# Patient Record
Sex: FEMALE | Race: ASIAN | NOT HISPANIC OR LATINO
[De-identification: names, ages, dates, MRNs, and addresses within clinical notes are randomized per-mention and may not be internally consistent; named-entity substitution may affect disease eponyms.]

---

## 2019-09-23 ENCOUNTER — APPOINTMENT (OUTPATIENT)
Dept: ORTHOPEDIC SURGERY | Facility: CLINIC | Age: 69
End: 2019-09-23

## 2019-10-04 PROBLEM — Z00.00 ENCOUNTER FOR PREVENTIVE HEALTH EXAMINATION: Status: ACTIVE | Noted: 2019-10-04

## 2019-10-07 ENCOUNTER — APPOINTMENT (OUTPATIENT)
Dept: ORTHOPEDIC SURGERY | Facility: CLINIC | Age: 69
End: 2019-10-07
Payer: MEDICAID

## 2019-10-07 VITALS — DIASTOLIC BLOOD PRESSURE: 62 MMHG | SYSTOLIC BLOOD PRESSURE: 148 MMHG | HEART RATE: 60 BPM

## 2019-10-07 PROCEDURE — 99203 OFFICE O/P NEW LOW 30 MIN: CPT

## 2020-01-21 ENCOUNTER — OUTPATIENT (OUTPATIENT)
Dept: OUTPATIENT SERVICES | Facility: HOSPITAL | Age: 70
LOS: 1 days | End: 2020-01-21
Payer: MEDICAID

## 2020-01-21 VITALS
TEMPERATURE: 97 F | HEIGHT: 61 IN | WEIGHT: 160.94 LBS | RESPIRATION RATE: 16 BRPM | OXYGEN SATURATION: 99 % | DIASTOLIC BLOOD PRESSURE: 78 MMHG | HEART RATE: 58 BPM | SYSTOLIC BLOOD PRESSURE: 140 MMHG

## 2020-01-21 DIAGNOSIS — M17.11 UNILATERAL PRIMARY OSTEOARTHRITIS, RIGHT KNEE: ICD-10-CM

## 2020-01-21 DIAGNOSIS — M19.90 UNSPECIFIED OSTEOARTHRITIS, UNSPECIFIED SITE: ICD-10-CM

## 2020-01-21 LAB
ANION GAP SERPL CALC-SCNC: 16 MMO/L — HIGH (ref 7–14)
APPEARANCE UR: CLEAR — SIGNIFICANT CHANGE UP
BILIRUB UR-MCNC: NEGATIVE — SIGNIFICANT CHANGE UP
BLD GP AB SCN SERPL QL: NEGATIVE — SIGNIFICANT CHANGE UP
BLOOD UR QL VISUAL: NEGATIVE — SIGNIFICANT CHANGE UP
BUN SERPL-MCNC: 12 MG/DL — SIGNIFICANT CHANGE UP (ref 7–23)
CALCIUM SERPL-MCNC: 9.4 MG/DL — SIGNIFICANT CHANGE UP (ref 8.4–10.5)
CHLORIDE SERPL-SCNC: 97 MMOL/L — LOW (ref 98–107)
CO2 SERPL-SCNC: 21 MMOL/L — LOW (ref 22–31)
COLOR SPEC: SIGNIFICANT CHANGE UP
CREAT SERPL-MCNC: 0.91 MG/DL — SIGNIFICANT CHANGE UP (ref 0.5–1.3)
GLUCOSE SERPL-MCNC: 82 MG/DL — SIGNIFICANT CHANGE UP (ref 70–99)
GLUCOSE UR-MCNC: NEGATIVE — SIGNIFICANT CHANGE UP
HCT VFR BLD CALC: 35.4 % — SIGNIFICANT CHANGE UP (ref 34.5–45)
HGB BLD-MCNC: 11.1 G/DL — LOW (ref 11.5–15.5)
KETONES UR-MCNC: NEGATIVE — SIGNIFICANT CHANGE UP
LEUKOCYTE ESTERASE UR-ACNC: NEGATIVE — SIGNIFICANT CHANGE UP
MCHC RBC-ENTMCNC: 25.3 PG — LOW (ref 27–34)
MCHC RBC-ENTMCNC: 31.4 % — LOW (ref 32–36)
MCV RBC AUTO: 80.8 FL — SIGNIFICANT CHANGE UP (ref 80–100)
NITRITE UR-MCNC: NEGATIVE — SIGNIFICANT CHANGE UP
NRBC # FLD: 0 K/UL — SIGNIFICANT CHANGE UP (ref 0–0)
PH UR: 6 — SIGNIFICANT CHANGE UP (ref 5–8)
PLATELET # BLD AUTO: 169 K/UL — SIGNIFICANT CHANGE UP (ref 150–400)
PMV BLD: 12.6 FL — SIGNIFICANT CHANGE UP (ref 7–13)
POTASSIUM SERPL-MCNC: 4.1 MMOL/L — SIGNIFICANT CHANGE UP (ref 3.5–5.3)
POTASSIUM SERPL-SCNC: 4.1 MMOL/L — SIGNIFICANT CHANGE UP (ref 3.5–5.3)
PROT UR-MCNC: NEGATIVE — SIGNIFICANT CHANGE UP
RBC # BLD: 4.38 M/UL — SIGNIFICANT CHANGE UP (ref 3.8–5.2)
RBC # FLD: 14 % — SIGNIFICANT CHANGE UP (ref 10.3–14.5)
RH IG SCN BLD-IMP: POSITIVE — SIGNIFICANT CHANGE UP
SODIUM SERPL-SCNC: 134 MMOL/L — LOW (ref 135–145)
SP GR SPEC: 1.01 — SIGNIFICANT CHANGE UP (ref 1–1.04)
UROBILINOGEN FLD QL: NORMAL — SIGNIFICANT CHANGE UP
WBC # BLD: 7.71 K/UL — SIGNIFICANT CHANGE UP (ref 3.8–10.5)
WBC # FLD AUTO: 7.71 K/UL — SIGNIFICANT CHANGE UP (ref 3.8–10.5)

## 2020-01-21 PROCEDURE — 93010 ELECTROCARDIOGRAM REPORT: CPT

## 2020-01-21 RX ORDER — SODIUM CHLORIDE 9 MG/ML
1000 INJECTION, SOLUTION INTRAVENOUS
Refills: 0 | Status: DISCONTINUED | OUTPATIENT
Start: 2020-02-06 | End: 2020-02-06

## 2020-01-21 NOTE — H&P PST ADULT - MUSCULOSKELETAL COMMENTS
hx of bilateral knee pain x5 years, worsening.  Dx with osteoarthritis scheduled for right total knee replacement 2/6/20. bilateral knee pain worsens with movement

## 2020-01-21 NOTE — H&P PST ADULT - HISTORY OF PRESENT ILLNESS
68 y/o female with hx of bilateral knee pain x5 years, worsening.  Dx with osteoarthritis scheduled for right total knee replacement 2/6/20.

## 2020-01-21 NOTE — H&P PST ADULT - INTERPRETATION SERVICES DECLINED
Patient/Caregiver requests family/friend to interpret./son.  Pt would like interpretation service day of surgery

## 2020-01-21 NOTE — H&P PST ADULT - NSICDXPROBLEM_GEN_ALL_CORE_FT
PROBLEM DIAGNOSES  Problem: Osteoarthritis  Assessment and Plan: Right total knee replacement 2/6/20.   CBc BMP T&S UA CS nasal culture  Preop instructions and antibacterial soap given and explained (verbal and written), with teach back.   Pt to see PMD for pre-op eval as advised by surgeon.  MAXIM precautions, OR booking informed

## 2020-01-21 NOTE — H&P PST ADULT - NSANTHOSAYNRD_GEN_A_CORE
No. MAXIM screening performed.  STOP BANG Legend: 0-2 = LOW Risk; 3-4 = INTERMEDIATE Risk; 5-8 = HIGH Risk

## 2020-01-22 LAB — SPECIMEN SOURCE: SIGNIFICANT CHANGE UP

## 2020-01-23 LAB
BACTERIA UR CULT: SIGNIFICANT CHANGE UP
SPECIMEN SOURCE: SIGNIFICANT CHANGE UP

## 2020-01-24 LAB — BACTERIA NPH CULT: SIGNIFICANT CHANGE UP

## 2020-02-01 ENCOUNTER — OUTPATIENT (OUTPATIENT)
Dept: OUTPATIENT SERVICES | Facility: HOSPITAL | Age: 70
LOS: 1 days | End: 2020-02-01
Payer: MEDICAID

## 2020-02-05 ENCOUNTER — TRANSCRIPTION ENCOUNTER (OUTPATIENT)
Age: 70
End: 2020-02-05

## 2020-02-05 NOTE — ASU PATIENT PROFILE, ADULT - LANGUAGE ASSISTANCE NEEDED
pt requested son to translate through /Yes-Patient/Caregiver accepts free interpretation services...

## 2020-02-06 ENCOUNTER — APPOINTMENT (OUTPATIENT)
Dept: ORTHOPEDIC SURGERY | Facility: HOSPITAL | Age: 70
End: 2020-02-06

## 2020-02-06 ENCOUNTER — RESULT REVIEW (OUTPATIENT)
Age: 70
End: 2020-02-06

## 2020-02-06 ENCOUNTER — INPATIENT (INPATIENT)
Facility: HOSPITAL | Age: 70
LOS: 3 days | Discharge: HOME | End: 2020-02-10
Attending: ORTHOPAEDIC SURGERY | Admitting: ORTHOPAEDIC SURGERY
Payer: MEDICAID

## 2020-02-06 VITALS
HEIGHT: 62 IN | HEART RATE: 59 BPM | TEMPERATURE: 98 F | DIASTOLIC BLOOD PRESSURE: 63 MMHG | RESPIRATION RATE: 14 BRPM | WEIGHT: 164.02 LBS | SYSTOLIC BLOOD PRESSURE: 170 MMHG | OXYGEN SATURATION: 100 %

## 2020-02-06 DIAGNOSIS — M17.11 UNILATERAL PRIMARY OSTEOARTHRITIS, RIGHT KNEE: ICD-10-CM

## 2020-02-06 LAB
ANION GAP SERPL CALC-SCNC: 11 MMO/L — SIGNIFICANT CHANGE UP (ref 7–14)
ANION GAP SERPL CALC-SCNC: 13 MMO/L — SIGNIFICANT CHANGE UP (ref 7–14)
BUN SERPL-MCNC: 12 MG/DL — SIGNIFICANT CHANGE UP (ref 7–23)
BUN SERPL-MCNC: 13 MG/DL — SIGNIFICANT CHANGE UP (ref 7–23)
CALCIUM SERPL-MCNC: 8.6 MG/DL — SIGNIFICANT CHANGE UP (ref 8.4–10.5)
CALCIUM SERPL-MCNC: 8.9 MG/DL — SIGNIFICANT CHANGE UP (ref 8.4–10.5)
CHLORIDE SERPL-SCNC: 87 MMOL/L — LOW (ref 98–107)
CHLORIDE SERPL-SCNC: 90 MMOL/L — LOW (ref 98–107)
CO2 SERPL-SCNC: 23 MMOL/L — SIGNIFICANT CHANGE UP (ref 22–31)
CO2 SERPL-SCNC: 23 MMOL/L — SIGNIFICANT CHANGE UP (ref 22–31)
CREAT SERPL-MCNC: 0.99 MG/DL — SIGNIFICANT CHANGE UP (ref 0.5–1.3)
CREAT SERPL-MCNC: 1.01 MG/DL — SIGNIFICANT CHANGE UP (ref 0.5–1.3)
GLUCOSE SERPL-MCNC: 106 MG/DL — HIGH (ref 70–99)
GLUCOSE SERPL-MCNC: 99 MG/DL — SIGNIFICANT CHANGE UP (ref 70–99)
HCT VFR BLD CALC: 28.9 % — LOW (ref 34.5–45)
HGB BLD-MCNC: 9.6 G/DL — LOW (ref 11.5–15.5)
MCHC RBC-ENTMCNC: 26 PG — LOW (ref 27–34)
MCHC RBC-ENTMCNC: 33.2 % — SIGNIFICANT CHANGE UP (ref 32–36)
MCV RBC AUTO: 78.3 FL — LOW (ref 80–100)
NRBC # FLD: 0 K/UL — SIGNIFICANT CHANGE UP (ref 0–0)
PLATELET # BLD AUTO: 153 K/UL — SIGNIFICANT CHANGE UP (ref 150–400)
PMV BLD: 12.8 FL — SIGNIFICANT CHANGE UP (ref 7–13)
POTASSIUM SERPL-MCNC: 3.9 MMOL/L — SIGNIFICANT CHANGE UP (ref 3.5–5.3)
POTASSIUM SERPL-MCNC: 3.9 MMOL/L — SIGNIFICANT CHANGE UP (ref 3.5–5.3)
POTASSIUM SERPL-SCNC: 3.9 MMOL/L — SIGNIFICANT CHANGE UP (ref 3.5–5.3)
POTASSIUM SERPL-SCNC: 3.9 MMOL/L — SIGNIFICANT CHANGE UP (ref 3.5–5.3)
RBC # BLD: 3.69 M/UL — LOW (ref 3.8–5.2)
RBC # FLD: 13.6 % — SIGNIFICANT CHANGE UP (ref 10.3–14.5)
RH IG SCN BLD-IMP: POSITIVE — SIGNIFICANT CHANGE UP
SODIUM SERPL-SCNC: 123 MMOL/L — LOW (ref 135–145)
SODIUM SERPL-SCNC: 124 MMOL/L — LOW (ref 135–145)
WBC # BLD: 7.19 K/UL — SIGNIFICANT CHANGE UP (ref 3.8–10.5)
WBC # FLD AUTO: 7.19 K/UL — SIGNIFICANT CHANGE UP (ref 3.8–10.5)

## 2020-02-06 PROCEDURE — 88311 DECALCIFY TISSUE: CPT | Mod: 26

## 2020-02-06 PROCEDURE — 27447 TOTAL KNEE ARTHROPLASTY: CPT | Mod: RT

## 2020-02-06 PROCEDURE — 73560 X-RAY EXAM OF KNEE 1 OR 2: CPT | Mod: 26,RT

## 2020-02-06 PROCEDURE — 88305 TISSUE EXAM BY PATHOLOGIST: CPT | Mod: 26

## 2020-02-06 RX ORDER — KETOROLAC TROMETHAMINE 30 MG/ML
15 SYRINGE (ML) INJECTION EVERY 6 HOURS
Refills: 0 | Status: DISCONTINUED | OUTPATIENT
Start: 2020-02-06 | End: 2020-02-08

## 2020-02-06 RX ORDER — LISINOPRIL 2.5 MG/1
20 TABLET ORAL DAILY
Refills: 0 | Status: DISCONTINUED | OUTPATIENT
Start: 2020-02-06 | End: 2020-02-07

## 2020-02-06 RX ORDER — ACETAMINOPHEN 500 MG
975 TABLET ORAL EVERY 8 HOURS
Refills: 0 | Status: DISCONTINUED | OUTPATIENT
Start: 2020-02-06 | End: 2020-02-10

## 2020-02-06 RX ORDER — OXYCODONE HYDROCHLORIDE 5 MG/1
5 TABLET ORAL EVERY 4 HOURS
Refills: 0 | Status: DISCONTINUED | OUTPATIENT
Start: 2020-02-06 | End: 2020-02-10

## 2020-02-06 RX ORDER — SODIUM CHLORIDE 9 MG/ML
500 INJECTION INTRAMUSCULAR; INTRAVENOUS; SUBCUTANEOUS ONCE
Refills: 0 | Status: DISCONTINUED | OUTPATIENT
Start: 2020-02-06 | End: 2020-02-07

## 2020-02-06 RX ORDER — GABAPENTIN 400 MG/1
300 CAPSULE ORAL ONCE
Refills: 0 | Status: COMPLETED | OUTPATIENT
Start: 2020-02-06 | End: 2020-02-06

## 2020-02-06 RX ORDER — SENNA PLUS 8.6 MG/1
2 TABLET ORAL AT BEDTIME
Refills: 0 | Status: DISCONTINUED | OUTPATIENT
Start: 2020-02-06 | End: 2020-02-07

## 2020-02-06 RX ORDER — TRAMADOL HYDROCHLORIDE 50 MG/1
50 TABLET ORAL ONCE
Refills: 0 | Status: DISCONTINUED | OUTPATIENT
Start: 2020-02-06 | End: 2020-02-06

## 2020-02-06 RX ORDER — POLYETHYLENE GLYCOL 3350 17 G/17G
17 POWDER, FOR SOLUTION ORAL DAILY
Refills: 0 | Status: DISCONTINUED | OUTPATIENT
Start: 2020-02-06 | End: 2020-02-10

## 2020-02-06 RX ORDER — TRAMADOL HYDROCHLORIDE 50 MG/1
50 TABLET ORAL EVERY 8 HOURS
Refills: 0 | Status: DISCONTINUED | OUTPATIENT
Start: 2020-02-06 | End: 2020-02-10

## 2020-02-06 RX ORDER — ACETAMINOPHEN 500 MG
975 TABLET ORAL ONCE
Refills: 0 | Status: COMPLETED | OUTPATIENT
Start: 2020-02-06 | End: 2020-02-06

## 2020-02-06 RX ORDER — HYDROCHLOROTHIAZIDE 25 MG
12.5 TABLET ORAL DAILY
Refills: 0 | Status: DISCONTINUED | OUTPATIENT
Start: 2020-02-06 | End: 2020-02-06

## 2020-02-06 RX ORDER — METOPROLOL TARTRATE 50 MG
50 TABLET ORAL DAILY
Refills: 0 | Status: DISCONTINUED | OUTPATIENT
Start: 2020-02-06 | End: 2020-02-07

## 2020-02-06 RX ORDER — PANTOPRAZOLE SODIUM 20 MG/1
40 TABLET, DELAYED RELEASE ORAL
Refills: 0 | Status: DISCONTINUED | OUTPATIENT
Start: 2020-02-06 | End: 2020-02-10

## 2020-02-06 RX ORDER — ONDANSETRON 8 MG/1
4 TABLET, FILM COATED ORAL EVERY 6 HOURS
Refills: 0 | Status: DISCONTINUED | OUTPATIENT
Start: 2020-02-06 | End: 2020-02-10

## 2020-02-06 RX ORDER — MAGNESIUM HYDROXIDE 400 MG/1
30 TABLET, CHEWABLE ORAL DAILY
Refills: 0 | Status: DISCONTINUED | OUTPATIENT
Start: 2020-02-06 | End: 2020-02-10

## 2020-02-06 RX ORDER — SODIUM CHLORIDE 9 MG/ML
500 INJECTION INTRAMUSCULAR; INTRAVENOUS; SUBCUTANEOUS ONCE
Refills: 0 | Status: COMPLETED | OUTPATIENT
Start: 2020-02-06 | End: 2020-02-06

## 2020-02-06 RX ORDER — HYDROMORPHONE HYDROCHLORIDE 2 MG/ML
0.5 INJECTION INTRAMUSCULAR; INTRAVENOUS; SUBCUTANEOUS EVERY 4 HOURS
Refills: 0 | Status: DISCONTINUED | OUTPATIENT
Start: 2020-02-06 | End: 2020-02-07

## 2020-02-06 RX ORDER — SODIUM CHLORIDE 9 MG/ML
1000 INJECTION INTRAMUSCULAR; INTRAVENOUS; SUBCUTANEOUS
Refills: 0 | Status: DISCONTINUED | OUTPATIENT
Start: 2020-02-06 | End: 2020-02-06

## 2020-02-06 RX ORDER — ASPIRIN/CALCIUM CARB/MAGNESIUM 324 MG
325 TABLET ORAL
Refills: 0 | Status: DISCONTINUED | OUTPATIENT
Start: 2020-02-06 | End: 2020-02-10

## 2020-02-06 RX ORDER — PANTOPRAZOLE SODIUM 20 MG/1
40 TABLET, DELAYED RELEASE ORAL ONCE
Refills: 0 | Status: COMPLETED | OUTPATIENT
Start: 2020-02-06 | End: 2020-02-06

## 2020-02-06 RX ORDER — CEFAZOLIN SODIUM 1 G
2000 VIAL (EA) INJECTION EVERY 8 HOURS
Refills: 0 | Status: COMPLETED | OUTPATIENT
Start: 2020-02-06 | End: 2020-02-07

## 2020-02-06 RX ORDER — ONDANSETRON 8 MG/1
4 TABLET, FILM COATED ORAL ONCE
Refills: 0 | Status: DISCONTINUED | OUTPATIENT
Start: 2020-02-06 | End: 2020-02-06

## 2020-02-06 RX ORDER — HYDROMORPHONE HYDROCHLORIDE 2 MG/ML
0.5 INJECTION INTRAMUSCULAR; INTRAVENOUS; SUBCUTANEOUS
Refills: 0 | Status: DISCONTINUED | OUTPATIENT
Start: 2020-02-06 | End: 2020-02-06

## 2020-02-06 RX ORDER — OXYCODONE HYDROCHLORIDE 5 MG/1
10 TABLET ORAL EVERY 4 HOURS
Refills: 0 | Status: DISCONTINUED | OUTPATIENT
Start: 2020-02-06 | End: 2020-02-10

## 2020-02-06 RX ADMIN — Medication 975 MILLIGRAM(S): at 12:09

## 2020-02-06 RX ADMIN — Medication 975 MILLIGRAM(S): at 20:07

## 2020-02-06 RX ADMIN — SODIUM CHLORIDE 30 MILLILITER(S): 9 INJECTION, SOLUTION INTRAVENOUS at 12:08

## 2020-02-06 RX ADMIN — Medication 325 MILLIGRAM(S): at 20:07

## 2020-02-06 RX ADMIN — GABAPENTIN 300 MILLIGRAM(S): 400 CAPSULE ORAL at 12:08

## 2020-02-06 RX ADMIN — TRAMADOL HYDROCHLORIDE 50 MILLIGRAM(S): 50 TABLET ORAL at 12:10

## 2020-02-06 RX ADMIN — Medication 15 MILLIGRAM(S): at 17:30

## 2020-02-06 RX ADMIN — Medication 15 MILLIGRAM(S): at 17:15

## 2020-02-06 RX ADMIN — TRAMADOL HYDROCHLORIDE 50 MILLIGRAM(S): 50 TABLET ORAL at 21:29

## 2020-02-06 RX ADMIN — Medication 100 MILLIGRAM(S): at 21:29

## 2020-02-06 RX ADMIN — PANTOPRAZOLE SODIUM 40 MILLIGRAM(S): 20 TABLET, DELAYED RELEASE ORAL at 12:09

## 2020-02-06 RX ADMIN — SODIUM CHLORIDE 500 MILLILITER(S): 9 INJECTION INTRAMUSCULAR; INTRAVENOUS; SUBCUTANEOUS at 20:07

## 2020-02-06 NOTE — ASU PREOP CHECKLIST - 1.
speaks Daniel, requests son Tremayne Saleh to translate through  #[id number] Marisela speaks Daniel, requests son Tremayne Saleh [phone] to translate through  #[id number] Marisela

## 2020-02-06 NOTE — PROGRESS NOTE ADULT - SUBJECTIVE AND OBJECTIVE BOX
Patient is seen and examined. Denies CP/SOB/DIzziness/N/V/D/HA. Pain is controlled.     Vital Signs Last 24 Hrs  T(C): 36.4 (06 Feb 2020 16:30), Max: 36.6 (06 Feb 2020 11:16)  T(F): 97.5 (06 Feb 2020 16:30), Max: 97.9 (06 Feb 2020 11:16)  HR: 55 (06 Feb 2020 17:00) (52 - 59)  BP: 140/74 (06 Feb 2020 17:00) (110/53 - 170/63)  BP(mean): 91 (06 Feb 2020 17:00) (67 - 91)  RR: 20 (06 Feb 2020 17:00) (12 - 20)  SpO2: 100% (06 Feb 2020 17:00) (100% - 100%)    Gen: NAD    RLE: Dressing C/D/I  Motor intact RLE. Sensation is grossly intact in the RLE. Compartments are soft, extremities are warm. DP 1+ BL LE.    Labs: Pending     A/P: Patient is a 69y Female s/p R TKA    - Pain control / Analgesia  - Anticoagulation  -PT/OT  -WBAT  -OOB  -Inc Spirometry  -Foot Pumps  -F/U AM Labs  -Notify Ortho with any questions

## 2020-02-07 ENCOUNTER — TRANSCRIPTION ENCOUNTER (OUTPATIENT)
Age: 70
End: 2020-02-07

## 2020-02-07 DIAGNOSIS — D62 ACUTE POSTHEMORRHAGIC ANEMIA: ICD-10-CM

## 2020-02-07 DIAGNOSIS — E87.6 HYPOKALEMIA: ICD-10-CM

## 2020-02-07 DIAGNOSIS — M17.11 UNILATERAL PRIMARY OSTEOARTHRITIS, RIGHT KNEE: ICD-10-CM

## 2020-02-07 DIAGNOSIS — Z29.9 ENCOUNTER FOR PROPHYLACTIC MEASURES, UNSPECIFIED: ICD-10-CM

## 2020-02-07 DIAGNOSIS — I10 ESSENTIAL (PRIMARY) HYPERTENSION: ICD-10-CM

## 2020-02-07 DIAGNOSIS — E87.1 HYPO-OSMOLALITY AND HYPONATREMIA: ICD-10-CM

## 2020-02-07 DIAGNOSIS — K21.9 GASTRO-ESOPHAGEAL REFLUX DISEASE WITHOUT ESOPHAGITIS: ICD-10-CM

## 2020-02-07 LAB
ANION GAP SERPL CALC-SCNC: 10 MMO/L — SIGNIFICANT CHANGE UP (ref 7–14)
ANION GAP SERPL CALC-SCNC: 11 MMO/L — SIGNIFICANT CHANGE UP (ref 7–14)
BLD GP AB SCN SERPL QL: NEGATIVE — SIGNIFICANT CHANGE UP
BUN SERPL-MCNC: 10 MG/DL — SIGNIFICANT CHANGE UP (ref 7–23)
BUN SERPL-MCNC: 11 MG/DL — SIGNIFICANT CHANGE UP (ref 7–23)
CALCIUM SERPL-MCNC: 8.1 MG/DL — LOW (ref 8.4–10.5)
CALCIUM SERPL-MCNC: 8.1 MG/DL — LOW (ref 8.4–10.5)
CHLORIDE SERPL-SCNC: 84 MMOL/L — LOW (ref 98–107)
CHLORIDE SERPL-SCNC: 89 MMOL/L — LOW (ref 98–107)
CO2 SERPL-SCNC: 21 MMOL/L — LOW (ref 22–31)
CO2 SERPL-SCNC: 22 MMOL/L — SIGNIFICANT CHANGE UP (ref 22–31)
CREAT SERPL-MCNC: 0.89 MG/DL — SIGNIFICANT CHANGE UP (ref 0.5–1.3)
CREAT SERPL-MCNC: 0.97 MG/DL — SIGNIFICANT CHANGE UP (ref 0.5–1.3)
GLUCOSE SERPL-MCNC: 105 MG/DL — HIGH (ref 70–99)
GLUCOSE SERPL-MCNC: 124 MG/DL — HIGH (ref 70–99)
HCT VFR BLD CALC: 27.6 % — LOW (ref 34.5–45)
HGB BLD-MCNC: 9 G/DL — LOW (ref 11.5–15.5)
MCHC RBC-ENTMCNC: 25.9 PG — LOW (ref 27–34)
MCHC RBC-ENTMCNC: 32.6 % — SIGNIFICANT CHANGE UP (ref 32–36)
MCV RBC AUTO: 79.5 FL — LOW (ref 80–100)
NRBC # FLD: 0 K/UL — SIGNIFICANT CHANGE UP (ref 0–0)
OSMOLALITY SERPL: 250 MOSMO/KG — LOW (ref 275–295)
PLATELET # BLD AUTO: 130 K/UL — LOW (ref 150–400)
PMV BLD: 13.1 FL — HIGH (ref 7–13)
POTASSIUM SERPL-MCNC: 3.3 MMOL/L — LOW (ref 3.5–5.3)
POTASSIUM SERPL-MCNC: 3.9 MMOL/L — SIGNIFICANT CHANGE UP (ref 3.5–5.3)
POTASSIUM SERPL-SCNC: 3.3 MMOL/L — LOW (ref 3.5–5.3)
POTASSIUM SERPL-SCNC: 3.9 MMOL/L — SIGNIFICANT CHANGE UP (ref 3.5–5.3)
RBC # BLD: 3.47 M/UL — LOW (ref 3.8–5.2)
RBC # FLD: 13.9 % — SIGNIFICANT CHANGE UP (ref 10.3–14.5)
RH IG SCN BLD-IMP: POSITIVE — SIGNIFICANT CHANGE UP
SODIUM SERPL-SCNC: 117 MMOL/L — CRITICAL LOW (ref 135–145)
SODIUM SERPL-SCNC: 120 MMOL/L — CRITICAL LOW (ref 135–145)
WBC # BLD: 8.04 K/UL — SIGNIFICANT CHANGE UP (ref 3.8–10.5)
WBC # FLD AUTO: 8.04 K/UL — SIGNIFICANT CHANGE UP (ref 3.8–10.5)

## 2020-02-07 PROCEDURE — 99223 1ST HOSP IP/OBS HIGH 75: CPT

## 2020-02-07 RX ORDER — POTASSIUM CHLORIDE 20 MEQ
20 PACKET (EA) ORAL ONCE
Refills: 0 | Status: COMPLETED | OUTPATIENT
Start: 2020-02-07 | End: 2020-02-07

## 2020-02-07 RX ORDER — HYDROMORPHONE HYDROCHLORIDE 2 MG/ML
0.5 INJECTION INTRAMUSCULAR; INTRAVENOUS; SUBCUTANEOUS ONCE
Refills: 0 | Status: DISCONTINUED | OUTPATIENT
Start: 2020-02-07 | End: 2020-02-07

## 2020-02-07 RX ORDER — METOPROLOL TARTRATE 50 MG
12.5 TABLET ORAL
Refills: 0 | Status: DISCONTINUED | OUTPATIENT
Start: 2020-02-07 | End: 2020-02-10

## 2020-02-07 RX ORDER — SENNA PLUS 8.6 MG/1
2 TABLET ORAL AT BEDTIME
Refills: 0 | Status: DISCONTINUED | OUTPATIENT
Start: 2020-02-07 | End: 2020-02-10

## 2020-02-07 RX ORDER — SODIUM CHLORIDE 9 MG/ML
2 INJECTION INTRAMUSCULAR; INTRAVENOUS; SUBCUTANEOUS ONCE
Refills: 0 | Status: COMPLETED | OUTPATIENT
Start: 2020-02-07 | End: 2020-02-07

## 2020-02-07 RX ADMIN — ONDANSETRON 4 MILLIGRAM(S): 8 TABLET, FILM COATED ORAL at 10:34

## 2020-02-07 RX ADMIN — Medication 20 MILLIEQUIVALENT(S): at 15:32

## 2020-02-07 RX ADMIN — TRAMADOL HYDROCHLORIDE 50 MILLIGRAM(S): 50 TABLET ORAL at 13:38

## 2020-02-07 RX ADMIN — Medication 975 MILLIGRAM(S): at 12:05

## 2020-02-07 RX ADMIN — Medication 325 MILLIGRAM(S): at 17:59

## 2020-02-07 RX ADMIN — Medication 15 MILLIGRAM(S): at 12:05

## 2020-02-07 RX ADMIN — SODIUM CHLORIDE 2 GRAM(S): 9 INJECTION INTRAMUSCULAR; INTRAVENOUS; SUBCUTANEOUS at 23:00

## 2020-02-07 RX ADMIN — Medication 50 MILLIGRAM(S): at 05:40

## 2020-02-07 RX ADMIN — PANTOPRAZOLE SODIUM 40 MILLIGRAM(S): 20 TABLET, DELAYED RELEASE ORAL at 05:40

## 2020-02-07 RX ADMIN — Medication 15 MILLIGRAM(S): at 05:39

## 2020-02-07 RX ADMIN — Medication 15 MILLIGRAM(S): at 18:00

## 2020-02-07 RX ADMIN — TRAMADOL HYDROCHLORIDE 50 MILLIGRAM(S): 50 TABLET ORAL at 21:45

## 2020-02-07 RX ADMIN — Medication 100 MILLIGRAM(S): at 05:39

## 2020-02-07 RX ADMIN — Medication 325 MILLIGRAM(S): at 05:40

## 2020-02-07 RX ADMIN — Medication 50 MILLIGRAM(S): at 18:00

## 2020-02-07 RX ADMIN — Medication 975 MILLIGRAM(S): at 04:22

## 2020-02-07 RX ADMIN — LISINOPRIL 20 MILLIGRAM(S): 2.5 TABLET ORAL at 05:40

## 2020-02-07 RX ADMIN — Medication 12.5 MILLIGRAM(S): at 17:59

## 2020-02-07 RX ADMIN — Medication 15 MILLIGRAM(S): at 00:04

## 2020-02-07 RX ADMIN — Medication 975 MILLIGRAM(S): at 20:07

## 2020-02-07 RX ADMIN — SENNA PLUS 2 TABLET(S): 8.6 TABLET ORAL at 21:45

## 2020-02-07 RX ADMIN — POLYETHYLENE GLYCOL 3350 17 GRAM(S): 17 POWDER, FOR SOLUTION ORAL at 12:05

## 2020-02-07 RX ADMIN — TRAMADOL HYDROCHLORIDE 50 MILLIGRAM(S): 50 TABLET ORAL at 05:42

## 2020-02-07 NOTE — DISCHARGE NOTE PROVIDER - NSDCCPCAREPLAN_GEN_ALL_CORE_FT
PRINCIPAL DISCHARGE DIAGNOSIS  Diagnosis: Primary osteoarthritis of right knee  Assessment and Plan of Treatment: 69year old female is admitted for elective Right total knee arthroplasty 2/6/2020 without any intraoperative complications.  Patient is doing well and stable for discharge.  Patient is tolerating physical therapy: weight bearing to Right leg, gait training, exercises as shown by Physical Therapy.  Keep wound dressing on as is until day of office visit.  Staples/sutures if applicable, to be removed in the office 14 days from date of surgery.  Patient is on Aspirin (MUST TAKE WITH FOOD) for anticoagulation.  Orthopaedic Surgeon Dr. Kaur would like patient to call private MD/Internist for appointment postop to maintain continuity of care.  Follow up with Dr. Kaur's office in 1-2 weeks. PRINCIPAL DISCHARGE DIAGNOSIS  Diagnosis: Primary osteoarthritis of right knee  Assessment and Plan of Treatment: 69year old female is admitted for elective Right total knee arthroplasty 2/6/2020 without any intraoperative complications.  Patient is doing well and stable for discharge.  Patient is tolerating physical therapy: weight bearing to Right leg, gait training, exercises as shown by Physical Therapy.  Keep wound dressing on as is until day of office visit.  Staples/sutures if applicable, to be removed in the office 14 days from date of surgery.  Patient is on Aspirin (MUST TAKE WITH FOOD) for anticoagulation.  Follow up with Dr. Kaur's office in 1-2 weeks.  Patient was managed medically by OHOS (Orthopaedic Hospitalist On Service) and found to have pre-renal hyponatremia, for which salt tabs and normal saline bolus resolved the low sodium level.  As per OHOS, patient not to resume home med hydrochlorothiazide until follow up with internist.  OHOS and Orthopaedic Surgeon Dr. Kaur would like patient to call private MD/Internist for appointment postop to maintain continuity of care.    Istop reviewed Reference #: [id number]

## 2020-02-07 NOTE — DISCHARGE NOTE PROVIDER - HOSPITAL COURSE
69year old female is admitted for elective Right total knee arthroplasty 2/6/2020 without any intraoperative complications.  Patient is doing well and stable for discharge.  Patient is tolerating physical therapy: weight bearing to Right leg, gait training, exercises as shown by Physical Therapy.  Keep wound dressing on as is until day of office visit.  Staples/sutures if applicable, to be removed in the office 14 days from date of surgery.  Patient is on Aspirin (MUST TAKE WITH FOOD) for anticoagulation.  Orthopaedic Surgeon Dr. Kaur would like patient to call private MD/Internist for appointment postop to maintain continuity of care.  Follow up with Dr. Kaur's office in 1-2 weeks. 69year old female is admitted for elective Right total knee arthroplasty 2/6/2020 without any intraoperative complications.  Patient is doing well and stable for discharge.  Patient is tolerating physical therapy: weight bearing to Right leg, gait training, exercises as shown by Physical Therapy.  Keep wound dressing on as is until day of office visit.  Staples/sutures if applicable, to be removed in the office 14 days from date of surgery.  Patient is on Aspirin (MUST TAKE WITH FOOD) for anticoagulation.  Follow up with Dr. Kaur's office in 1-2 weeks.  Patient was managed medically by OHOS (Orthopaedic Hospitalist On Service) and found to have pre-renal hyponatremia, for which salt tabs and normal saline bolus resolved the low sodium level.  As per OHOS, patient not to resume home med hydrochlorothiazide until follow up with internist.  OHOS and Orthopaedic Surgeon Dr. Kaur would like patient to call private MD/Internist for appointment postop to maintain continuity of care.          Istop reviewed Reference #: [id number]

## 2020-02-07 NOTE — DISCHARGE NOTE NURSING/CASE MANAGEMENT/SOCIAL WORK - NSDPDISTO_GEN_ALL_CORE
Home with home care/Surgical knee dressing clean, dry and intact.  Toes warm and mobile.  Surgical leg elevated as instructed. +neurovascular status,, +void.  Tolerated po and fluid.  OOB ambulating with walker and one stand by assist.  Foot pump in use.    Incentive spirometer with proper use and teach back.  Call bell in reach, safety maintained.

## 2020-02-07 NOTE — PROVIDER CONTACT NOTE (CRITICAL VALUE NOTIFICATION) - ASSESSMENT
Pt VSS in RA. Asymptomatic. Ready for transport to [address] [place] [address]
Pt. alert, oriented X4. Voids. OOB with walker and assist. Daniel speaking.

## 2020-02-07 NOTE — DISCHARGE NOTE NURSING/CASE MANAGEMENT/SOCIAL WORK - NSDCPECAREGIVERED_GEN_ALL_CORE
No/know your medication side effects, managing pain at home, exercise worksheet, sex positions after joint replacement,, knee replacement, discharge instruction sheet

## 2020-02-07 NOTE — OCCUPATIONAL THERAPY INITIAL EVALUATION ADULT - PRECAUTIONS/LIMITATIONS, REHAB EVAL
surgical precautions/fall precautions/Right Knee Immobilizer- only when ambulating, may discharge when patient able to actively straight leg raise. Pt is able to perform straight leg raise.

## 2020-02-07 NOTE — DISCHARGE NOTE PROVIDER - NSDCMRMEDTOKEN_GEN_ALL_CORE_FT
calcium: 1 tab oral daily  diclofenac potassium 50 mg oral tablet: 1 tab(s) orally 3 times a day, As Needed. last dose 1/21/20  Dry Eye Relief ophthalmic solution OTC: 1 drop each eye prn   lisinopril-hydrochlorothiazide 20 mg-12.5 mg oral tablet: 1 tab(s) orally once a day  metoprolol tartrate 50 mg oral tablet: orally once a day  omeprazole 20 mg oral delayed release tablet: 1 tab(s) orally once a day  raNITIdine 150 mg oral capsule: 1 cap(s) orally 2 times a day  Tab-A-Blanche oral tablet: 1 tab(s) orally once a day.. last dose  1/21/20 acetaminophen 325 mg oral capsule: 3 cap(s) orally every 8 hours   Adult Aspirin 325 mg oral tablet: 1 tab(s) orally 2 times a day   calcium: 1 tab oral daily  Colace 100 mg oral capsule: 1 cap(s) orally 3 times a day   Dry Eye Relief ophthalmic solution OTC: 1 drop each eye prn   gabapentin 100 mg oral capsule: 1 cap(s) orally every 8 hours   lisinopril-hydrochlorothiazide 20 mg-12.5 mg oral tablet: 1 tab(s) orally once a day  metoprolol tartrate 50 mg oral tablet: orally once a day  Mobic 15 mg oral tablet: 1 tab(s) orally once a day   oxyCODONE 5 mg oral tablet: 1-2  tab(s) orally every 6 hours MDD:8 tabs  as needed for severe pain only  Protonix 40 mg oral delayed release tablet: 1 tab(s) orally once a day   raNITIdine 150 mg oral capsule: 1 cap(s) orally 2 times a day  Tab-A-Blanche oral tablet: 1 tab(s) orally once a day.. last dose  1/21/20  traMADol 50 mg oral tablet: 1 tab(s) orally every 8 hours MDD:3 tabs acetaminophen 325 mg oral capsule: 3 cap(s) orally every 8 hours   Adult Aspirin 325 mg oral tablet: 1 tab(s) orally 2 times a day (with meals)   calcium: 1 tab oral daily  Colace 100 mg oral capsule: 1 cap(s) orally 3 times a day   Dry Eye Relief ophthalmic solution OTC: 1 drop each eye prn   gabapentin 100 mg oral capsule: 1 cap(s) orally every 8 hours   metoprolol tartrate 50 mg oral tablet: orally once a day  Mobic 15 mg oral tablet: 1 tab(s) orally once a day   oxyCODONE 5 mg oral tablet: 1-2  tab(s) orally every 6 hours MDD:8 tabs  as needed for severe pain only  Protonix 40 mg oral delayed release tablet: 1 tab(s) orally once a day   raNITIdine 150 mg oral capsule: 1 cap(s) orally 2 times a day  senna oral tablet: 2 tab(s) orally once a day (at bedtime)  Tab-A-Blanche oral tablet: 1 tab(s) orally once a day.. last dose  1/21/20  traMADol 50 mg oral tablet: 1 tab(s) orally every 8 hours MDD:3 tabs

## 2020-02-07 NOTE — PROGRESS NOTE ADULT - SUBJECTIVE AND OBJECTIVE BOX
Ortho Progress Note  JUANY GUZMAN   MRN-[id number]    69yFemale is s/p elective Right total knee arthroplasty POD#1 w/out any c/o.  Resting comfortably, NAD, Alert and awake    Vital Signs Last 24 Hrs  T(C): 36.3 (07 Feb 2020 05:33), Max: 36.6 (06 Feb 2020 11:16)  T(F): 97.4 (07 Feb 2020 05:33), Max: 97.9 (06 Feb 2020 11:16)  HR: 56 (07 Feb 2020 05:33) (52 - 68)  BP: 127/55 (07 Feb 2020 05:33) (110/53 - 170/63)  BP(mean): 94 (06 Feb 2020 17:30) (67 - 94)  RR: 16 (07 Feb 2020 05:33) (12 - 20)  SpO2: 100% (07 Feb 2020 05:33) (100% - 100%)  No Known Allergies      S/P R TKA   R knee wound postop ace dressing is C/D/I  knee immobilizer on  patient able to SLR on own  motor RLE EHL, TA, GS intact  sensation RLE intact to light touch                          9.6    7.19  )-----------( 153      ( 06 Feb 2020 16:33 )             28.9     02-06    124<L>  |  90<L>  |  12  ----------------------------<  99  3.9   |  23  |  0.99    Ca    8.6      06 Feb 2020 18:15          A/P Ortho Stable s/p elective Right total knee arthroplasty POD#1  ck Labs this am  continue Aspirin for anticoagulation   continue Physical Therapy BID: WBAT, OOB for gait training  discharge planning when pt is cleared by physical therapy for safe home discharge

## 2020-02-07 NOTE — OCCUPATIONAL THERAPY INITIAL EVALUATION ADULT - MD ORDER
Occupational Therapy (OT) to evaluate and treat. Occupational Therapy (OT) to evaluate and treat. Out of Bed to Chair. Per LOUIE Davey, pt is okay to participate in OT evaluation and perform activity as tolerated.

## 2020-02-07 NOTE — CONSULT NOTE ADULT - SUBJECTIVE AND OBJECTIVE BOX
HPI:  68 yo F with HTN, GERD, b/l knee OA here for scheduled R knee replacement.   Patient is doing OK, reports no pain currently, was able to go to the gym with PT.   Per RN noted SBP in 80s during PT, on return to room in 90s.  Patient also had 2 episodes of nonbloody vomiting today s/p breakfast.  No BM yet.  No CP, SOB, f/c.     Allergies: No Known Allergies    MEDICATIONS  (STANDING):  acetaminophen  Tablet  975 milliGRAM(s) Oral every 8 hours  aspirin enteric coated 325 milliGRAM(s) Oral two times a day  ketorolac   Injectable 15 milliGRAM(s) IV Push every 6 hours  metoprolol tartrate 12.5 milliGRAM(s) Oral two times a day  pantoprazole    Tablet 40 milliGRAM(s) Oral before breakfast  polyethylene glycol 3350 17 Gram(s) Oral daily  pregabalin 50 milliGRAM(s) Oral two times a day  senna 2 Tablet(s) Oral at bedtime  traMADol 50 milliGRAM(s) Oral every 8 hours    MEDICATIONS  (PRN):  aluminum hydroxide/magnesium hydroxide/simethicone Suspension 30 milliLiter(s) Oral four times a day PRN Indigestion  magnesium hydroxide Suspension 30 milliLiter(s) Oral daily PRN Constipation  ondansetron Injectable 4 milliGRAM(s) IV Push every 6 hours PRN Nausea and/or Vomiting  oxyCODONE    IR 5 milliGRAM(s) Oral every 4 hours PRN mild to moderate pain  oxyCODONE    IR 10 milliGRAM(s) Oral every 4 hours PRN Severe Pain (7 - 10)    PAST MEDICAL:   Hypertension  Chronic GERD  Osteoarthritis    SURGICAL HISTORY:  No significant past surgical history    FAMILY HISTORY:  No pertinent family history in first degree relatives    Social History:   Denies smoking, drinking or drug use     Review of Systems:   CONSTITUTIONAL: No fever, weight loss, or fatigue  EYES: No eye pain, visual disturbances, or discharge  ENMT:  No difficulty hearing, tinnitus, vertigo; No sinus or throat pain  NECK: No pain or stiffness  RESPIRATORY: No cough, wheezing, chills or hemoptysis; No shortness of breath  CARDIOVASCULAR: No chest pain, palpitations, dizziness, or leg swelling  GASTROINTESTINAL: No abdominal or epigastric pain. No nausea, vomiting, or hematemesis; No diarrhea or constipation. No melena or hematochezia.  GENITOURINARY: No dysuria, frequency, hematuria, or incontinence  NEUROLOGICAL: No headaches, memory loss, loss of strength, numbness, or tremors  SKIN: No itching, burning, rashes, or lesions   LYMPH NODES: No enlarged glands  ENDOCRINE: No heat or cold intolerance; No hair loss  MUSCULOSKELETAL: No joint pain or swelling; No muscle, back, or extremity pain  HEME/LYMPH: No easy bruising, or bleeding gums  ALLERGY AND IMMUNOLOGIC: No hives or eczema    T(C): 36.3 (02-07-20 @ 13:34), Max: 36.6 (02-07-20 @ 10:04)  HR: 62 (02-07-20 @ 13:34) (52 - 68)  BP: 112/55 (02-07-20 @ 13:34) (98/66 - 167/62)  RR: 16 (02-07-20 @ 13:34) (12 - 20)  SpO2: 100% (02-07-20 @ 13:34) (100% - 100%)    I&O's Summary    06 Feb 2020 07:01  -  07 Feb 2020 07:00  --------------------------------------------------------  IN: 480 mL / OUT: 600 mL / NET: -120 mL    PHYSICAL EXAM:  GENERAL: NAD, well-developed  HEAD:  Atraumatic, Normocephalic  EYES: EOMI, PERRLA, conjunctiva and sclera clear  NECK: Supple, No JVD  CHEST/LUNG: Clear to auscultation bilaterally; no wheeze  HEART: Regular rate and rhythm; no murmurs, rubs, or gallops  ABDOMEN: Soft, Nontender, Nondistended; Bowel sounds present  EXTREMITIES:  wwp, R knee dressing c/d/i   PSYCH: AAOx3  NEUROLOGY: non-focal  SKIN: No rashes or lesions    LABS:                        9.0    8.04  )-----------( 130      ( 07 Feb 2020 06:42 )             27.6     02-07    120<LL>  |  89<L>  |  11  ----------------------------<  105<H>  3.3<L>   |  21<L>  |  0.89    Ca    8.1<L>      07 Feb 2020 06:42    Notes Reviewed:  ortho 	  Care Discussed with Consultants/Other Providers: ortho PA

## 2020-02-07 NOTE — OCCUPATIONAL THERAPY INITIAL EVALUATION ADULT - PERTINENT HX OF CURRENT PROBLEM, REHAB EVAL
Pt is a 69 year old female with hx of bilateral knee pain x5 years, worsening.  Pt with dx of osteoarthritis. Pt is now s/p right total knee replacement 2/6/2020.

## 2020-02-07 NOTE — CONSULT NOTE ADULT - PROBLEM SELECTOR RECOMMENDATION 5
SBP low this am in therapy however did get her lisinopril 20 mg this am  - decrease lisinopril 5 mg  - decrease metoprolol 50 mg daily to 12.5 mg BID with hold parameters

## 2020-02-07 NOTE — DISCHARGE NOTE PROVIDER - NSDCACTIVITY_GEN_ALL_CORE
Walking - Outdoors allowed/Stairs allowed/No heavy lifting/straining/Showering allowed/Do not make important decisions/Do not drive or operate machinery/Walking - Indoors allowed

## 2020-02-07 NOTE — PHYSICAL THERAPY INITIAL EVALUATION ADULT - DISCHARGE DISPOSITION, PT EVAL
Home with home PT and rolling walker s/p right TKR to ensure safe use of new assistive device and stair negotiation in the home environment.

## 2020-02-07 NOTE — CHART NOTE - NSCHARTNOTEFT_GEN_A_CORE
Patient's repeat sodium was 117. Discussed with OHOS on call, d/c'd diuretics and gave 2g salt tabs as directed. Patient seen and examined, neurovascularly intact, AAOX3, following commands. Will follow up repeat BMP at 2AM

## 2020-02-07 NOTE — CONSULT NOTE ADULT - PROBLEM SELECTOR RECOMMENDATION 2
134-->120, suspect SIADH given appears euvolemic possibly worsened by prior HCTZ use and nausea/vomiting.  500cc of NS overnight caused Na to drop 123/124-->120.   - continue to hold HCTZ  - c/w fluid restriction to 1L for now until hypoNa further clarified   - serum osm 250s, f/u urine lytes  - repeat BMP in pm, may consider salt tabs pending above

## 2020-02-07 NOTE — OCCUPATIONAL THERAPY INITIAL EVALUATION ADULT - GENERAL OBSERVATIONS, REHAB EVAL
Pt. received semisupine in bed. No acute distress. Patient agreed to evaluation from Occupational Therapist. +Ace Wrap to Right LE, +Heplock, +Right Knee Immobilizer. Pt's primary language is Daniel.  phone utilized ( Lino #[id number])

## 2020-02-07 NOTE — DISCHARGE NOTE PROVIDER - CARE PROVIDER_API CALL
Hector Kaur)  Orthopaedic Sports Medicine; Orthopaedic Surgery  [address]  [place] [address]  Phone: [phone]  Fax: [phone]  Follow Up Time:

## 2020-02-07 NOTE — PHYSICAL THERAPY INITIAL EVALUATION ADULT - ADDITIONAL COMMENTS
Patient lives in a house with 3 steps to enter. Bedroom on ground level. Prior to admission, patient reports ambulating independently with a cane.     Patient was left seated on chair, all lines intact and call bell within reach, RN aware.

## 2020-02-07 NOTE — PHYSICAL THERAPY INITIAL EVALUATION ADULT - MANUAL MUSCLE TESTING RESULTS, REHAB EVAL
bilateral UEs & LEs grossly at least 3+/5, not formally tested secondary to recent surgery. +right lower extremity SLR, no knee immobilizer indicated at this time.

## 2020-02-07 NOTE — PHYSICAL THERAPY INITIAL EVALUATION ADULT - GENERAL OBSERVATIONS, REHAB EVAL
Patient received semisupine in bed, +heplock, +right knee ace wrap, in no apparent distress. Patient agreeable to participate in physical therapy evaluation.

## 2020-02-07 NOTE — PHYSICAL THERAPY INITIAL EVALUATION ADULT - PATIENT PROFILE REVIEW, REHAB EVAL
yes/PT orders received: OOB to chair. Consult with LOUIE Davey, patient may participate in PT evaluation.

## 2020-02-07 NOTE — OCCUPATIONAL THERAPY INITIAL EVALUATION ADULT - LIVES WITH, PROFILE
Pt. reports she lives with her son, daughter-in-law, and grandchildren in a house with 3 steps to enter. Once inside, pt. reports bedroom and bathroom are located on the main level. Per pt., she has a bathtub in her bathroom with grab bar & shower chair available.

## 2020-02-07 NOTE — PHYSICAL THERAPY INITIAL EVALUATION ADULT - PERTINENT HX OF CURRENT PROBLEM, REHAB EVAL
Patient is a 69 year old female with history of bilateral knee pain x 5 years, worsening.  Patient with diagnosis of osteoarthritis. Patient is now s/p right total knee replacement 2/6/2020.

## 2020-02-07 NOTE — DISCHARGE NOTE PROVIDER - NSDCCPTREATMENT_GEN_ALL_CORE_FT
PRINCIPAL PROCEDURE  Procedure: Total arthroplasty, knee  Findings and Treatment: dictated operative note  pain control, pain med may cause drowsiness, refrain from activities require decision making, physical therapy to help resume activities of daily living  weight bearing as tolerated to Right leg  call Surgeon's office to make appointment for 1-2 weeks from date of surgery Dr. Kaur [phone]

## 2020-02-07 NOTE — DISCHARGE NOTE NURSING/CASE MANAGEMENT/SOCIAL WORK - PATIENT PORTAL LINK FT
You can access the FollowMyHealth Patient Portal offered by [facility] by registering at the following website: [email or web address]. By joining [employer]’s FollowMyHealth portal, you will also be able to view your health information using other applications (apps) compatible with our system.

## 2020-02-08 LAB
ANION GAP SERPL CALC-SCNC: 10 MMO/L — SIGNIFICANT CHANGE UP (ref 7–14)
ANION GAP SERPL CALC-SCNC: 8 MMO/L — SIGNIFICANT CHANGE UP (ref 7–14)
ANION GAP SERPL CALC-SCNC: 9 MMO/L — SIGNIFICANT CHANGE UP (ref 7–14)
APPEARANCE UR: CLEAR — SIGNIFICANT CHANGE UP
BILIRUB UR-MCNC: NEGATIVE — SIGNIFICANT CHANGE UP
BLOOD UR QL VISUAL: NEGATIVE — SIGNIFICANT CHANGE UP
BUN SERPL-MCNC: 10 MG/DL — SIGNIFICANT CHANGE UP (ref 7–23)
BUN SERPL-MCNC: 10 MG/DL — SIGNIFICANT CHANGE UP (ref 7–23)
BUN SERPL-MCNC: 11 MG/DL — SIGNIFICANT CHANGE UP (ref 7–23)
CALCIUM SERPL-MCNC: 7.9 MG/DL — LOW (ref 8.4–10.5)
CALCIUM SERPL-MCNC: 8 MG/DL — LOW (ref 8.4–10.5)
CALCIUM SERPL-MCNC: 8.1 MG/DL — LOW (ref 8.4–10.5)
CHLORIDE SERPL-SCNC: 88 MMOL/L — LOW (ref 98–107)
CHLORIDE SERPL-SCNC: 89 MMOL/L — LOW (ref 98–107)
CHLORIDE SERPL-SCNC: 92 MMOL/L — LOW (ref 98–107)
CO2 SERPL-SCNC: 21 MMOL/L — LOW (ref 22–31)
CO2 SERPL-SCNC: 21 MMOL/L — LOW (ref 22–31)
CO2 SERPL-SCNC: 22 MMOL/L — SIGNIFICANT CHANGE UP (ref 22–31)
COLOR SPEC: YELLOW — SIGNIFICANT CHANGE UP
CREAT ?TM UR-MCNC: 112.6 MG/DL — SIGNIFICANT CHANGE UP
CREAT SERPL-MCNC: 0.85 MG/DL — SIGNIFICANT CHANGE UP (ref 0.5–1.3)
CREAT SERPL-MCNC: 0.91 MG/DL — SIGNIFICANT CHANGE UP (ref 0.5–1.3)
CREAT SERPL-MCNC: 0.96 MG/DL — SIGNIFICANT CHANGE UP (ref 0.5–1.3)
GLUCOSE SERPL-MCNC: 135 MG/DL — HIGH (ref 70–99)
GLUCOSE SERPL-MCNC: 90 MG/DL — SIGNIFICANT CHANGE UP (ref 70–99)
GLUCOSE SERPL-MCNC: 97 MG/DL — SIGNIFICANT CHANGE UP (ref 70–99)
GLUCOSE UR-MCNC: NEGATIVE — SIGNIFICANT CHANGE UP
HCT VFR BLD CALC: 26.4 % — LOW (ref 34.5–45)
HGB BLD-MCNC: 8.9 G/DL — LOW (ref 11.5–15.5)
KETONES UR-MCNC: NEGATIVE — SIGNIFICANT CHANGE UP
LEUKOCYTE ESTERASE UR-ACNC: NEGATIVE — SIGNIFICANT CHANGE UP
MCHC RBC-ENTMCNC: 26.2 PG — LOW (ref 27–34)
MCHC RBC-ENTMCNC: 33.7 % — SIGNIFICANT CHANGE UP (ref 32–36)
MCV RBC AUTO: 77.6 FL — LOW (ref 80–100)
NITRITE UR-MCNC: NEGATIVE — SIGNIFICANT CHANGE UP
NRBC # FLD: 0 K/UL — SIGNIFICANT CHANGE UP (ref 0–0)
OSMOLALITY UR: 302 MOSMO/KG — SIGNIFICANT CHANGE UP (ref 50–1200)
PH UR: 6 — SIGNIFICANT CHANGE UP (ref 5–8)
PLATELET # BLD AUTO: 121 K/UL — LOW (ref 150–400)
PMV BLD: 11.8 FL — SIGNIFICANT CHANGE UP (ref 7–13)
POTASSIUM SERPL-MCNC: 3.6 MMOL/L — SIGNIFICANT CHANGE UP (ref 3.5–5.3)
POTASSIUM SERPL-MCNC: 4 MMOL/L — SIGNIFICANT CHANGE UP (ref 3.5–5.3)
POTASSIUM SERPL-MCNC: 4.2 MMOL/L — SIGNIFICANT CHANGE UP (ref 3.5–5.3)
POTASSIUM SERPL-SCNC: 3.6 MMOL/L — SIGNIFICANT CHANGE UP (ref 3.5–5.3)
POTASSIUM SERPL-SCNC: 4 MMOL/L — SIGNIFICANT CHANGE UP (ref 3.5–5.3)
POTASSIUM SERPL-SCNC: 4.2 MMOL/L — SIGNIFICANT CHANGE UP (ref 3.5–5.3)
PROT UR-MCNC: NEGATIVE — SIGNIFICANT CHANGE UP
RBC # BLD: 3.4 M/UL — LOW (ref 3.8–5.2)
RBC # FLD: 14.1 % — SIGNIFICANT CHANGE UP (ref 10.3–14.5)
SODIUM SERPL-SCNC: 118 MMOL/L — CRITICAL LOW (ref 135–145)
SODIUM SERPL-SCNC: 121 MMOL/L — LOW (ref 135–145)
SODIUM SERPL-SCNC: 121 MMOL/L — LOW (ref 135–145)
SODIUM UR-SCNC: < 20 MMOL/L — SIGNIFICANT CHANGE UP
SP GR SPEC: 1.02 — SIGNIFICANT CHANGE UP (ref 1–1.04)
TSH SERPL-MCNC: 0.98 UIU/ML — SIGNIFICANT CHANGE UP (ref 0.27–4.2)
URATE UR-MCNC: 10.3 MG/DL — SIGNIFICANT CHANGE UP
UROBILINOGEN FLD QL: NORMAL — SIGNIFICANT CHANGE UP
UUN UR-MCNC: 416.1 MG/DL — SIGNIFICANT CHANGE UP
WBC # BLD: 8.74 K/UL — SIGNIFICANT CHANGE UP (ref 3.8–10.5)
WBC # FLD AUTO: 8.74 K/UL — SIGNIFICANT CHANGE UP (ref 3.8–10.5)

## 2020-02-08 PROCEDURE — 99233 SBSQ HOSP IP/OBS HIGH 50: CPT

## 2020-02-08 RX ORDER — GABAPENTIN 400 MG/1
1 CAPSULE ORAL
Qty: 33 | Refills: 0
Start: 2020-02-08 | End: 2020-02-18

## 2020-02-08 RX ORDER — ASPIRIN/CALCIUM CARB/MAGNESIUM 324 MG
1 TABLET ORAL
Qty: 80 | Refills: 0
Start: 2020-02-08 | End: 2020-03-18

## 2020-02-08 RX ORDER — SODIUM CHLORIDE 9 MG/ML
1000 INJECTION INTRAMUSCULAR; INTRAVENOUS; SUBCUTANEOUS ONCE
Refills: 0 | Status: COMPLETED | OUTPATIENT
Start: 2020-02-08 | End: 2020-02-08

## 2020-02-08 RX ORDER — ACETAMINOPHEN 500 MG
3 TABLET ORAL
Qty: 99 | Refills: 0
Start: 2020-02-08 | End: 2020-02-18

## 2020-02-08 RX ORDER — OXYCODONE HYDROCHLORIDE 5 MG/1
1 TABLET ORAL
Qty: 44 | Refills: 0
Start: 2020-02-08 | End: 2020-02-18

## 2020-02-08 RX ORDER — TRAMADOL HYDROCHLORIDE 50 MG/1
1 TABLET ORAL
Qty: 33 | Refills: 0
Start: 2020-02-08 | End: 2020-02-18

## 2020-02-08 RX ORDER — MELOXICAM 15 MG/1
1 TABLET ORAL
Qty: 11 | Refills: 0
Start: 2020-02-08 | End: 2020-02-18

## 2020-02-08 RX ORDER — PANTOPRAZOLE SODIUM 20 MG/1
1 TABLET, DELAYED RELEASE ORAL
Qty: 40 | Refills: 0
Start: 2020-02-08 | End: 2020-03-18

## 2020-02-08 RX ORDER — SODIUM CHLORIDE 9 MG/ML
2 INJECTION INTRAMUSCULAR; INTRAVENOUS; SUBCUTANEOUS EVERY 8 HOURS
Refills: 0 | Status: DISCONTINUED | OUTPATIENT
Start: 2020-02-08 | End: 2020-02-10

## 2020-02-08 RX ORDER — DOCUSATE SODIUM 100 MG
1 CAPSULE ORAL
Qty: 21 | Refills: 0
Start: 2020-02-08 | End: 2020-02-14

## 2020-02-08 RX ORDER — SODIUM CHLORIDE 9 MG/ML
1 INJECTION INTRAMUSCULAR; INTRAVENOUS; SUBCUTANEOUS THREE TIMES A DAY
Refills: 0 | Status: DISCONTINUED | OUTPATIENT
Start: 2020-02-08 | End: 2020-02-08

## 2020-02-08 RX ORDER — DICLOFENAC SODIUM 75 MG/1
1 TABLET, DELAYED RELEASE ORAL
Qty: 0 | Refills: 0 | DISCHARGE

## 2020-02-08 RX ORDER — SODIUM CHLORIDE 9 MG/ML
2 INJECTION INTRAMUSCULAR; INTRAVENOUS; SUBCUTANEOUS ONCE
Refills: 0 | Status: COMPLETED | OUTPATIENT
Start: 2020-02-08 | End: 2020-02-08

## 2020-02-08 RX ORDER — OMEPRAZOLE 10 MG/1
1 CAPSULE, DELAYED RELEASE ORAL
Qty: 0 | Refills: 0 | DISCHARGE

## 2020-02-08 RX ADMIN — Medication 975 MILLIGRAM(S): at 11:59

## 2020-02-08 RX ADMIN — SODIUM CHLORIDE 2 GRAM(S): 9 INJECTION INTRAMUSCULAR; INTRAVENOUS; SUBCUTANEOUS at 05:46

## 2020-02-08 RX ADMIN — SODIUM CHLORIDE 500 MILLILITER(S): 9 INJECTION INTRAMUSCULAR; INTRAVENOUS; SUBCUTANEOUS at 13:14

## 2020-02-08 RX ADMIN — Medication 325 MILLIGRAM(S): at 19:12

## 2020-02-08 RX ADMIN — SODIUM CHLORIDE 1 GRAM(S): 9 INJECTION INTRAMUSCULAR; INTRAVENOUS; SUBCUTANEOUS at 13:20

## 2020-02-08 RX ADMIN — SENNA PLUS 2 TABLET(S): 8.6 TABLET ORAL at 23:23

## 2020-02-08 RX ADMIN — Medication 325 MILLIGRAM(S): at 05:46

## 2020-02-08 RX ADMIN — TRAMADOL HYDROCHLORIDE 50 MILLIGRAM(S): 50 TABLET ORAL at 05:46

## 2020-02-08 RX ADMIN — Medication 975 MILLIGRAM(S): at 19:13

## 2020-02-08 RX ADMIN — PANTOPRAZOLE SODIUM 40 MILLIGRAM(S): 20 TABLET, DELAYED RELEASE ORAL at 05:46

## 2020-02-08 RX ADMIN — SODIUM CHLORIDE 2 GRAM(S): 9 INJECTION INTRAMUSCULAR; INTRAVENOUS; SUBCUTANEOUS at 23:23

## 2020-02-08 RX ADMIN — TRAMADOL HYDROCHLORIDE 50 MILLIGRAM(S): 50 TABLET ORAL at 23:23

## 2020-02-08 RX ADMIN — Medication 15 MILLIGRAM(S): at 00:04

## 2020-02-08 RX ADMIN — Medication 50 MILLIGRAM(S): at 05:46

## 2020-02-08 RX ADMIN — Medication 975 MILLIGRAM(S): at 04:05

## 2020-02-08 RX ADMIN — Medication 12.5 MILLIGRAM(S): at 19:13

## 2020-02-08 RX ADMIN — Medication 50 MILLIGRAM(S): at 19:13

## 2020-02-08 RX ADMIN — POLYETHYLENE GLYCOL 3350 17 GRAM(S): 17 POWDER, FOR SOLUTION ORAL at 12:00

## 2020-02-08 NOTE — PROGRESS NOTE ADULT - ATTENDING COMMENTS
POD 1 s/p Right TKR  Afeb, vss  Right knee bandages clean and dry.  OOB to chair/PT - gait training  Discharge planning.
Patient seen.  Afeb, vss.    Right knee bandages clean and dry.  DNVI right foot and ankle.    OOB with PT.  Hospitalist to help with hyponatremia.

## 2020-02-08 NOTE — PROGRESS NOTE ADULT - ASSESSMENT
A/P: Right total knee arthroplasty   ck Labs this am  continue Aspirin for anticoagulation   continue Physical Therapy BID: WBAT, OOB for gait training  Home with home PT

## 2020-02-08 NOTE — PROVIDER CONTACT NOTE (CRITICAL VALUE NOTIFICATION) - ACTION/TREATMENT ORDERED:
Re-draw BMP, collect urine specimen when pt voids. Transport pt to  tower.
No interventions ordered at this time.
Sodium Tablet 2g PO given   BMP to be drawn at 02:00 02/08/20
Ordered urine lab orders.

## 2020-02-08 NOTE — PROVIDER CONTACT NOTE (CRITICAL VALUE NOTIFICATION) - SITUATION
68 yo female s/p right total knee replacement  Na 123
Noted low Na 120 mmol/L
Pt with hyponatremia - received sodium tablets
Sodium: 117

## 2020-02-08 NOTE — PROGRESS NOTE ADULT - SUBJECTIVE AND OBJECTIVE BOX
Patient is a 69y old  Female who presents with a chief complaint of for knee replacement    SUBJECTIVE / OVERNIGHT EVENTS:    Feels well, no CP, SOB  No n/v noted today  Voiding in bathroom     MEDICATIONS  (STANDING):  acetaminophen   Tablet  975 milliGRAM(s) Oral every 8 hours  aspirin enteric coated 325 milliGRAM(s) Oral two times a day  metoprolol tartrate 12.5 milliGRAM(s) Oral two times a day  pantoprazole    Tablet 40 milliGRAM(s) Oral before breakfast  polyethylene glycol 3350 17 Gram(s) Oral daily  pregabalin 50 milliGRAM(s) Oral two times a day  senna 2 Tablet(s) Oral at bedtime  sodium chloride 1 Gram(s) Oral three times a day  sodium chloride 0.9% Bolus 1000 milliLiter(s) IV Bolus once  traMADol 50 milliGRAM(s) Oral every 8 hours    MEDICATIONS  (PRN):  aluminum hydroxide/magnesium hydroxide/simethicone Suspension 30 milliLiter(s) Oral four times a day PRN Indigestion  bisacodyl Suppository 10 milliGRAM(s) Rectal daily PRN If no bowel movement by postoperative day #2  magnesium hydroxide Suspension 30 milliLiter(s) Oral daily PRN Constipation  ondansetron Injectable 4 milliGRAM(s) IV Push every 6 hours PRN Nausea and/or Vomiting  oxyCODONE    IR 5 milliGRAM(s) Oral every 4 hours PRN mild to moderate pain  oxyCODONE    IR 10 milliGRAM(s) Oral every 4 hours PRN Severe Pain (7 - 10)    T(C): 36.3 (20 @ 09:35), Max: 36.8 (20 @ 17:46)  HR: 65 (20 @ 09:35) (56 - 67)  BP: 116/64 (20 @ 09:35) (110/61 - 149/55)  RR: 18 (20 @ 09:35) (16 - 18)  SpO2: 100% (20 @ 09:35) (100% - 100%)    I&O's Summary    2020 07:01  -  2020 07:00  --------------------------------------------------------  IN: 0 mL / OUT: 375 mL / NET: -375 mL    2020 07:01  -  2020 12:31  --------------------------------------------------------  IN: 0 mL / OUT: 300 mL / NET: -300 mL    PHYSICAL EXAM:  GENERAL: NAD, well-developed  HEAD:  Atraumatic, Normocephalic  EYES: EOMI, PERRLA, conjunctiva and sclera clear  NECK: Supple, No JVD  CHEST/LUNG: Clear to auscultation bilaterally; no wheeze  HEART: Regular rate and rhythm; no murmurs, rubs, or gallops  ABDOMEN: Soft, Nontender, Nondistended; Bowel sounds present  EXTREMITIES:  wwp, R knee dressing c/d/i   PSYCH: AAOx3  NEUROLOGY: non-focal  SKIN: No rashes or lesions    LABS:                        8.9    8.74  )-----------( 121      ( 2020 11:56 )             26.4     02-08    118<LL>  |  88<L>  |  10  ----------------------------<  90  3.6   |  21<L>  |  0.91    Ca    8.0<L>      2020 02:45    Urinalysis Basic - ( 2020 11:22 )  Color: YELLOW / Appearance: CLEAR / S.016 / pH: 6.0  Gluc: NEGATIVE / Ketone: NEGATIVE  / Bili: NEGATIVE / Urobili: NORMAL   Blood: NEGATIVE / Protein: NEGATIVE / Nitrite: NEGATIVE   Leuk Esterase: NEGATIVE / RBC: x / WBC x   Sq Epi: x / Non Sq Epi: x / Bacteria: x    Notes Reviewed:  ortho   Care Discussed with Consultants/Other Providers: ortho

## 2020-02-08 NOTE — PROGRESS NOTE ADULT - ASSESSMENT
70 yo F with HTN, GERD, b/l knee OA s/p R knee replacement on 2/6 course c/b hyponatremia.    # Primary osteoarthritis of right knee:  - care per ortho   - pain control and bowel regimen  - PT    # Hyponatremia.  Recommendation: 134-->120-->117-->118, there was concern for SIADH given n/v and decrease in Na s/p bolus of NS, nausea better today, given Na tabs x2 overnight.   Today urine studies with Na <20 and osm of 302 not as concentrated as would expect, possibly mixed picture.   - will bolus 1L NS and repeat BMP ~5pm  - poor PO intake can c/w salt tabs for now, encourage PO intake   - continue to hold HCTZ    # Hypokalemia:  - resolved s/p repletion     # Postoperative anemia due to acute blood loss: Hb 11.1 to 9-->8.9 likely due to expected losses in OR  - trend  - transfuse <7 or symptoms.     # Hypertension: SBP low 2/7 in therapy however did get her lisinopril 20 mg  - holding lisinopril and HCTZ  - decrease metoprolol 50 mg daily to 12.5 mg BID with hold parameters  - BP remains stable off BP meds,  monitor BP closely     # Chronic GERD  - c/w PPI    # Prophylactic measure  - asa 325 ppx per primary team  - dispo pending improvement in Na 68 yo F with HTN, GERD, b/l knee OA s/p R knee replacement on 2/6 course c/b hyponatremia.    # Primary osteoarthritis of right knee:  - care per ortho   - pain control and bowel regimen  - PT    # Hyponatremia.  Recommendation: in Jan 134, more recently 123-->120-->117-->118, there was concern for SIADH given n/v and decrease in Na s/p bolus of NS, nausea better today, given Na tabs x2 overnight.   Today urine studies with Na <20 and osm of 302 not as concentrated as would expect, possibly mixed picture  - will bolus 1L NS and repeat BMP ~6pm  - poor PO intake can c/w salt tabs for now, encourage PO intake   - continue to hold HCTZ    # Hypokalemia:  - resolved s/p repletion     # Postoperative anemia due to acute blood loss: Hb 11.1 to 9-->8.9 likely due to expected losses in OR  - trend  - transfuse <7 or symptoms.     # Hypertension: SBP low 2/7 in therapy however did get her lisinopril 20 mg  - holding lisinopril and HCTZ  - decrease metoprolol 50 mg daily to 12.5 mg BID with hold parameters  - BP remains stable off BP meds,  monitor BP closely     # Chronic GERD  - c/w PPI    # Prophylactic measure  - asa 325 ppx per primary team  - dispo pending improvement in Na

## 2020-02-08 NOTE — PROGRESS NOTE ADULT - SUBJECTIVE AND OBJECTIVE BOX
S: 69yFemale is s/p elective Right total knee arthroplasty POD#2; patient was hyponatremic overnight to 117, discussed with medicine on call and gave 2g of salt tabs, d/c'd diuretic and all fluids, and f/u repeat BMP. Resting comfortably, NAD, Alert and awake, no acute neurologic deficits     O:  ICU Vital Signs Last 24 Hrs  T(C): 36.6 (07 Feb 2020 20:36), Max: 36.8 (07 Feb 2020 17:46)  T(F): 97.9 (07 Feb 2020 20:36), Max: 98.2 (07 Feb 2020 17:46)  HR: 60 (07 Feb 2020 20:36) (56 - 68)  BP: 138/66 (07 Feb 2020 21:41) (98/66 - 149/55)  BP(mean): --  ABP: --  ABP(mean): --  RR: 16 (07 Feb 2020 20:36) (16 - 17)  SpO2: 100% (07 Feb 2020 20:36) (100% - 100%)                          9.0    8.04  )-----------( 130      ( 07 Feb 2020 06:42 )             27.6   02-07    117<LL>  |  84<L>  |  10  ----------------------------<  124<H>  3.9   |  22  |  0.97    Ca    8.1<L>      07 Feb 2020 19:56    Physical exam:  R knee wound postop ace dressing is C/D/I  patient able to SLR on own  motor RLE EHL, TA, GS intact  sensation RLE intact to light touch S: 69yFemale is s/p elective Right total knee arthroplasty POD#2; patient was hyponatremic overnight to 117, discussed with medicine on call and gave 2g of salt tabs, d/c'd diuretic and all fluids, and f/u repeat BMP. Resting comfortably, NAD, Alert and awake, no acute neurologic deficits. Repeat was 118, 2g salt tab was repeated    O:  ICU Vital Signs Last 24 Hrs  T(C): 36.6 (07 Feb 2020 20:36), Max: 36.8 (07 Feb 2020 17:46)  T(F): 97.9 (07 Feb 2020 20:36), Max: 98.2 (07 Feb 2020 17:46)  HR: 60 (07 Feb 2020 20:36) (56 - 68)  BP: 138/66 (07 Feb 2020 21:41) (98/66 - 149/55)  BP(mean): --  ABP: --  ABP(mean): --  RR: 16 (07 Feb 2020 20:36) (16 - 17)  SpO2: 100% (07 Feb 2020 20:36) (100% - 100%)                          9.0    8.04  )-----------( 130      ( 07 Feb 2020 06:42 )             27.6   02-07    117<LL>  |  84<L>  |  10  ----------------------------<  124<H>  3.9   |  22  |  0.97    Ca    8.1<L>      07 Feb 2020 19:56    Physical exam:  R knee wound postop ace dressing is C/D/I  patient able to SLR on own  motor RLE EHL, TA, GS intact  sensation RLE intact to light touch

## 2020-02-09 LAB
ANION GAP SERPL CALC-SCNC: 7 MMO/L — SIGNIFICANT CHANGE UP (ref 7–14)
ANION GAP SERPL CALC-SCNC: 9 MMO/L — SIGNIFICANT CHANGE UP (ref 7–14)
BUN SERPL-MCNC: 10 MG/DL — SIGNIFICANT CHANGE UP (ref 7–23)
BUN SERPL-MCNC: 10 MG/DL — SIGNIFICANT CHANGE UP (ref 7–23)
CALCIUM SERPL-MCNC: 8.1 MG/DL — LOW (ref 8.4–10.5)
CALCIUM SERPL-MCNC: 8.2 MG/DL — LOW (ref 8.4–10.5)
CHLORIDE SERPL-SCNC: 97 MMOL/L — LOW (ref 98–107)
CHLORIDE SERPL-SCNC: 98 MMOL/L — SIGNIFICANT CHANGE UP (ref 98–107)
CO2 SERPL-SCNC: 21 MMOL/L — LOW (ref 22–31)
CO2 SERPL-SCNC: 22 MMOL/L — SIGNIFICANT CHANGE UP (ref 22–31)
CORTIS SERPL-MCNC: 6.1 UG/DL — SIGNIFICANT CHANGE UP (ref 2.7–18.4)
CREAT SERPL-MCNC: 0.79 MG/DL — SIGNIFICANT CHANGE UP (ref 0.5–1.3)
CREAT SERPL-MCNC: 0.82 MG/DL — SIGNIFICANT CHANGE UP (ref 0.5–1.3)
GLUCOSE SERPL-MCNC: 108 MG/DL — HIGH (ref 70–99)
GLUCOSE SERPL-MCNC: 96 MG/DL — SIGNIFICANT CHANGE UP (ref 70–99)
HCT VFR BLD CALC: 24.5 % — LOW (ref 34.5–45)
HGB BLD-MCNC: 8.2 G/DL — LOW (ref 11.5–15.5)
MCHC RBC-ENTMCNC: 26.4 PG — LOW (ref 27–34)
MCHC RBC-ENTMCNC: 33.5 % — SIGNIFICANT CHANGE UP (ref 32–36)
MCV RBC AUTO: 78.8 FL — LOW (ref 80–100)
NRBC # FLD: 0 K/UL — SIGNIFICANT CHANGE UP (ref 0–0)
OSMOLALITY UR: 148 MOSMO/KG — SIGNIFICANT CHANGE UP (ref 50–1200)
PLATELET # BLD AUTO: 134 K/UL — LOW (ref 150–400)
PMV BLD: 13 FL — SIGNIFICANT CHANGE UP (ref 7–13)
POTASSIUM SERPL-MCNC: 4.4 MMOL/L — SIGNIFICANT CHANGE UP (ref 3.5–5.3)
POTASSIUM SERPL-MCNC: 4.5 MMOL/L — SIGNIFICANT CHANGE UP (ref 3.5–5.3)
POTASSIUM SERPL-SCNC: 4.4 MMOL/L — SIGNIFICANT CHANGE UP (ref 3.5–5.3)
POTASSIUM SERPL-SCNC: 4.5 MMOL/L — SIGNIFICANT CHANGE UP (ref 3.5–5.3)
RBC # BLD: 3.11 M/UL — LOW (ref 3.8–5.2)
RBC # FLD: 14.6 % — HIGH (ref 10.3–14.5)
SODIUM SERPL-SCNC: 127 MMOL/L — LOW (ref 135–145)
SODIUM SERPL-SCNC: 127 MMOL/L — LOW (ref 135–145)
SODIUM UR-SCNC: < 20 MMOL/L — SIGNIFICANT CHANGE UP
WBC # BLD: 9.17 K/UL — SIGNIFICANT CHANGE UP (ref 3.8–10.5)
WBC # FLD AUTO: 9.17 K/UL — SIGNIFICANT CHANGE UP (ref 3.8–10.5)

## 2020-02-09 PROCEDURE — 99233 SBSQ HOSP IP/OBS HIGH 50: CPT

## 2020-02-09 RX ORDER — SODIUM CHLORIDE 9 MG/ML
500 INJECTION INTRAMUSCULAR; INTRAVENOUS; SUBCUTANEOUS ONCE
Refills: 0 | Status: COMPLETED | OUTPATIENT
Start: 2020-02-09 | End: 2020-02-09

## 2020-02-09 RX ADMIN — Medication 12.5 MILLIGRAM(S): at 17:30

## 2020-02-09 RX ADMIN — Medication 325 MILLIGRAM(S): at 05:45

## 2020-02-09 RX ADMIN — Medication 975 MILLIGRAM(S): at 21:09

## 2020-02-09 RX ADMIN — Medication 975 MILLIGRAM(S): at 11:08

## 2020-02-09 RX ADMIN — TRAMADOL HYDROCHLORIDE 50 MILLIGRAM(S): 50 TABLET ORAL at 21:32

## 2020-02-09 RX ADMIN — Medication 50 MILLIGRAM(S): at 17:30

## 2020-02-09 RX ADMIN — SODIUM CHLORIDE 2 GRAM(S): 9 INJECTION INTRAMUSCULAR; INTRAVENOUS; SUBCUTANEOUS at 05:45

## 2020-02-09 RX ADMIN — PANTOPRAZOLE SODIUM 40 MILLIGRAM(S): 20 TABLET, DELAYED RELEASE ORAL at 05:46

## 2020-02-09 RX ADMIN — SODIUM CHLORIDE 2 GRAM(S): 9 INJECTION INTRAMUSCULAR; INTRAVENOUS; SUBCUTANEOUS at 15:21

## 2020-02-09 RX ADMIN — Medication 12.5 MILLIGRAM(S): at 05:45

## 2020-02-09 RX ADMIN — SODIUM CHLORIDE 500 MILLILITER(S): 9 INJECTION INTRAMUSCULAR; INTRAVENOUS; SUBCUTANEOUS at 11:12

## 2020-02-09 RX ADMIN — SODIUM CHLORIDE 2 GRAM(S): 9 INJECTION INTRAMUSCULAR; INTRAVENOUS; SUBCUTANEOUS at 21:32

## 2020-02-09 RX ADMIN — TRAMADOL HYDROCHLORIDE 50 MILLIGRAM(S): 50 TABLET ORAL at 05:45

## 2020-02-09 RX ADMIN — Medication 50 MILLIGRAM(S): at 05:45

## 2020-02-09 RX ADMIN — Medication 975 MILLIGRAM(S): at 03:53

## 2020-02-09 RX ADMIN — Medication 325 MILLIGRAM(S): at 17:30

## 2020-02-09 NOTE — PROGRESS NOTE ADULT - SUBJECTIVE AND OBJECTIVE BOX
Orthopedic Surgery Progress Note    S: Patient seen and examined today. No acute events overnight. Pain is well controlled. Denies f/c, chest pain, sob, dizziness, changes in mental status. Patient with hyponatremia postoperatively. on salt tabs and received NS bolus as per hospitalist rest. Patient without complaints.  O:  Vital Signs Last 24 Hrs  T(C): 36.7 (09 Feb 2020 01:33), Max: 36.9 (08 Feb 2020 21:18)  T(F): 98 (09 Feb 2020 01:33), Max: 98.4 (08 Feb 2020 21:18)  HR: 63 (09 Feb 2020 01:33) (56 - 67)  BP: 125/58 (09 Feb 2020 01:33) (108/54 - 145/62)  BP(mean): --  RR: 16 (09 Feb 2020 01:33) (16 - 18)  SpO2: 100% (09 Feb 2020 01:33) (100% - 100%)    Gen: NAD  RLE    Dressing clean, dry, intact  EHL/FHL/TA/GS intact  SILT DP/SP/Hopkins/Sa  WWP distally                          8.9    8.74  )-----------( 121      ( 08 Feb 2020 11:56 )             26.4                         9.0    8.04  )-----------( 130      ( 07 Feb 2020 06:42 )             27.6       02-08    121<L>  |  92<L>  |  10  ----------------------------<  135<H>  4.2   |  21<L>  |  0.85

## 2020-02-09 NOTE — PROGRESS NOTE ADULT - SUBJECTIVE AND OBJECTIVE BOX
Patient is a 69y old  Female who presents with a chief complaint of for knee replacement    SUBJECTIVE / OVERNIGHT EVENTS:    No new complaints, no n/v  Able to get OOB and walk with walker with PT, gait/balance seems decent     MEDICATIONS  (STANDING):  acetaminophen  Tablet 975 milliGRAM(s) Oral every 8 hours  aspirin enteric coated 325 milliGRAM(s) Oral two times a day  metoprolol tartrate 12.5 milliGRAM(s) Oral two times a day  pantoprazole    Tablet 40 milliGRAM(s) Oral before breakfast  polyethylene glycol 3350 17 Gram(s) Oral daily  pregabalin 50 milliGRAM(s) Oral two times a day  senna 2 Tablet(s) Oral at bedtime  sodium chloride 2 Gram(s) Oral every 8 hours  sodium chloride 0.9% Bolus 500 milliLiter(s) IV Bolus once  traMADol 50 milliGRAM(s) Oral every 8 hours    MEDICATIONS  (PRN):  aluminum hydroxide/magnesium hydroxide/simethicone Suspension 30 milliLiter(s) Oral four times a day PRN Indigestion  bisacodyl Suppository 10 milliGRAM(s) Rectal daily PRN If no bowel movement by postoperative day #2  magnesium hydroxide Suspension 30 milliLiter(s) Oral daily PRN Constipation  ondansetron Injectable 4 milliGRAM(s) IV Push every 6 hours PRN Nausea and/or Vomiting  oxyCODONE    IR 5 milliGRAM(s) Oral every 4 hours PRN mild to moderate pain  oxyCODONE    IR 10 milliGRAM(s) Oral every 4 hours PRN Severe Pain (7 - 10)    T(C): 36.3 (20 @ 09:15), Max: 36.9 (20 @ 21:18)  HR: 65 (20 @ 09:15) (60 - 67)  BP: 112/56 (20 @ 09:15) (108/54 - 145/62)  RR: 18 (20 @ 09:15) (16 - 18)  SpO2: 100% (20 @ 09:15) (100% - 100%)    I&O's Summary    2020 07:01  -  2020 07:00  --------------------------------------------------------  IN: 0 mL / OUT: 900 mL / NET: -900 mL    PHYSICAL EXAM:  GENERAL: NAD, well-developed  HEAD:  Atraumatic, Normocephalic  EYES: EOMI, PERRLA, conjunctiva and sclera clear  NECK: Supple, No JVD  CHEST/LUNG: Clear to auscultation bilaterally; no wheeze  HEART: Regular rate and rhythm; no murmurs, rubs, or gallops  ABDOMEN: Soft, Nontender, Nondistended; Bowel sounds present  EXTREMITIES:  wwp, R knee ace dressing c/d/i   PSYCH: AAOx3  NEUROLOGY: non-focal  SKIN: No rashes or lesions    LABS:                        8.2    9.17  )-----------( 134      ( 2020 05:45 )             24.5     02-09    127<L>  |  98  |  10  ----------------------------<  96  4.4   |  22  |  0.79    Ca    8.1<L>      2020 07:48    Urinalysis Basic - ( 2020 11:22 )  Color: YELLOW / Appearance: CLEAR / S.016 / pH: 6.0  Gluc: NEGATIVE / Ketone: NEGATIVE  / Bili: NEGATIVE / Urobili: NORMAL   Blood: NEGATIVE / Protein: NEGATIVE / Nitrite: NEGATIVE   Leuk Esterase: NEGATIVE / RBC: x / WBC x   Sq Epi: x / Non Sq Epi: x / Bacteria: x    Notes Reviewed:  ortho   Care Discussed with Consultants/Other Providers:  ortho

## 2020-02-09 NOTE — PROGRESS NOTE ADULT - ASSESSMENT
68 yo F with HTN, GERD, b/l knee OA s/p R knee replacement on 2/6 course c/b hyponatremia.    # Primary osteoarthritis of right knee:  - care per ortho   - pain control and bowel regimen  - PT    # Hyponatremia.  Recommendation: in Jan 134, more recently 123-->120-->117-->118-->121--127, there was concern for SIADH given n/v and decrease in Na s/p bolus of NS however urine lytes did not support this (Na <20 and Osm <400).  Likely combination of hypovolemia and thiazide use improved with NS + salt tabs.   - 500 cc NS x1  - c/w 2g Na tabs for now--will not be discharged on salt tabs   - continue to hold HCTZ, DO NOT resume on discharge   - goal Na of 130 for dc as low Na is associated with falls and may compound fall risk in setting of recent knee replacement     # Postoperative anemia due to acute blood loss: Hb 11.1 to 9-->8.9-->8.2 likely due to expected losses in OR  - trend  - transfuse <7 or symptoms     # Hypertension: at baseline hypertensive however here has been normotensive with most of her meds on hold or reduced dosing  - continue to hold lisinopril and HCTZ  - decrease metoprolol 50 mg daily to 12.5 mg BID with hold parameters  - BP remains at goal on above,  monitor BP closely     # Chronic GERD  - c/w PPI    # Prophylactic measure  - asa 325 ppx per primary team  - dispo pending improvement in Na, goal >130

## 2020-02-09 NOTE — PROGRESS NOTE ADULT - ASSESSMENT
A/P 69y year old Female s/p R  TKA. Postop pre-renal hyponatremia  - FU AM BMP, urine lytes  - salt tabs  - Pain Control  - WBAT  - PT/OOB  - DVT PPx  - Dispo Planning: home if sodium uptrends as per hospitalist recs

## 2020-02-10 VITALS
OXYGEN SATURATION: 98 % | SYSTOLIC BLOOD PRESSURE: 139 MMHG | DIASTOLIC BLOOD PRESSURE: 43 MMHG | HEART RATE: 73 BPM | TEMPERATURE: 97 F | RESPIRATION RATE: 16 BRPM

## 2020-02-10 DIAGNOSIS — Z71.89 OTHER SPECIFIED COUNSELING: ICD-10-CM

## 2020-02-10 PROBLEM — K21.9 GASTRO-ESOPHAGEAL REFLUX DISEASE WITHOUT ESOPHAGITIS: Chronic | Status: ACTIVE | Noted: 2020-01-21

## 2020-02-10 PROBLEM — I10 ESSENTIAL (PRIMARY) HYPERTENSION: Chronic | Status: ACTIVE | Noted: 2020-01-21

## 2020-02-10 PROBLEM — M19.90 UNSPECIFIED OSTEOARTHRITIS, UNSPECIFIED SITE: Chronic | Status: ACTIVE | Noted: 2020-01-21

## 2020-02-10 LAB
ANION GAP SERPL CALC-SCNC: 9 MMO/L — SIGNIFICANT CHANGE UP (ref 7–14)
BUN SERPL-MCNC: 9 MG/DL — SIGNIFICANT CHANGE UP (ref 7–23)
CALCIUM SERPL-MCNC: 8.5 MG/DL — SIGNIFICANT CHANGE UP (ref 8.4–10.5)
CHLORIDE SERPL-SCNC: 100 MMOL/L — SIGNIFICANT CHANGE UP (ref 98–107)
CO2 SERPL-SCNC: 22 MMOL/L — SIGNIFICANT CHANGE UP (ref 22–31)
CREAT SERPL-MCNC: 0.8 MG/DL — SIGNIFICANT CHANGE UP (ref 0.5–1.3)
GLUCOSE SERPL-MCNC: 87 MG/DL — SIGNIFICANT CHANGE UP (ref 70–99)
POTASSIUM SERPL-MCNC: 4.8 MMOL/L — SIGNIFICANT CHANGE UP (ref 3.5–5.3)
POTASSIUM SERPL-SCNC: 4.8 MMOL/L — SIGNIFICANT CHANGE UP (ref 3.5–5.3)
SODIUM SERPL-SCNC: 131 MMOL/L — LOW (ref 135–145)

## 2020-02-10 PROCEDURE — 99238 HOSP IP/OBS DSCHRG MGMT 30/<: CPT

## 2020-02-10 PROCEDURE — 99233 SBSQ HOSP IP/OBS HIGH 50: CPT

## 2020-02-10 RX ORDER — LISINOPRIL/HYDROCHLOROTHIAZIDE 10-12.5 MG
1 TABLET ORAL
Qty: 0 | Refills: 0 | DISCHARGE

## 2020-02-10 RX ORDER — ASPIRIN/CALCIUM CARB/MAGNESIUM 324 MG
1 TABLET ORAL
Qty: 80 | Refills: 0
Start: 2020-02-10 | End: 2020-03-20

## 2020-02-10 RX ORDER — SENNA PLUS 8.6 MG/1
2 TABLET ORAL
Qty: 60 | Refills: 0
Start: 2020-02-10 | End: 2020-03-10

## 2020-02-10 RX ADMIN — TRAMADOL HYDROCHLORIDE 50 MILLIGRAM(S): 50 TABLET ORAL at 12:55

## 2020-02-10 RX ADMIN — Medication 325 MILLIGRAM(S): at 05:25

## 2020-02-10 RX ADMIN — Medication 50 MILLIGRAM(S): at 05:25

## 2020-02-10 RX ADMIN — Medication 975 MILLIGRAM(S): at 12:55

## 2020-02-10 RX ADMIN — PANTOPRAZOLE SODIUM 40 MILLIGRAM(S): 20 TABLET, DELAYED RELEASE ORAL at 05:25

## 2020-02-10 RX ADMIN — SODIUM CHLORIDE 2 GRAM(S): 9 INJECTION INTRAMUSCULAR; INTRAVENOUS; SUBCUTANEOUS at 13:00

## 2020-02-10 RX ADMIN — Medication 975 MILLIGRAM(S): at 05:26

## 2020-02-10 RX ADMIN — SODIUM CHLORIDE 2 GRAM(S): 9 INJECTION INTRAMUSCULAR; INTRAVENOUS; SUBCUTANEOUS at 05:25

## 2020-02-10 NOTE — PROGRESS NOTE ADULT - ASSESSMENT
68 yo F with HTN, GERD, b/l knee OA s/p R knee replacement on 2/6 course c/b hyponatremia.    # Primary osteoarthritis of right knee:  - care per ortho   - pain controlled   - F/U PT recs    # Hyponatremia.   most likely due to hypovolemia and thiazide use improved with NS + salt tabs.   - c/w 2g Na tabs for now--will not be discharged on salt tabs   - continue to hold HCTZ, DO NOT resume on discharge     # Postoperative anemia due to acute blood loss: Hb 11.1 to 9-->8.9-->8.2 likely due to expected losses in OR  - transfuse <7 or symptoms     # Hypertension: at baseline hypertensive however here has been normotensive with most of her meds on hold or reduced dosing  - continue to hold lisinopril and HCTZ  - decrease metoprolol 50 mg daily to 12.5 mg BID with hold parameters  - BP remains at goal on above,  monitor BP closely     # Chronic GERD  - c/w PPI    # Prophylactic measure  - asa 325 ppx per primary team  - dispo pending improvement in Na, goal >130

## 2020-02-10 NOTE — PROGRESS NOTE ADULT - SUBJECTIVE AND OBJECTIVE BOX
Patient is a 69y old  Female who presents with a chief complaint of R knee replacement c/b hypona (09 Feb 2020 11:04)      SUBJECTIVE / OVERNIGHT EVENTS:  Patient has no new complaints. Denies cp, SOB, abdominal pain, N/V/D     MEDICATIONS  (STANDING):  acetaminophen   Tablet .. 975 milliGRAM(s) Oral every 8 hours  aspirin enteric coated 325 milliGRAM(s) Oral two times a day  metoprolol tartrate 12.5 milliGRAM(s) Oral two times a day  pantoprazole    Tablet 40 milliGRAM(s) Oral before breakfast  polyethylene glycol 3350 17 Gram(s) Oral daily  pregabalin 50 milliGRAM(s) Oral two times a day  senna 2 Tablet(s) Oral at bedtime  sodium chloride 2 Gram(s) Oral every 8 hours  traMADol 50 milliGRAM(s) Oral every 8 hours    MEDICATIONS  (PRN):  aluminum hydroxide/magnesium hydroxide/simethicone Suspension 30 milliLiter(s) Oral four times a day PRN Indigestion  bisacodyl Suppository 10 milliGRAM(s) Rectal daily PRN If no bowel movement by postoperative day #2  magnesium hydroxide Suspension 30 milliLiter(s) Oral daily PRN Constipation  ondansetron Injectable 4 milliGRAM(s) IV Push every 6 hours PRN Nausea and/or Vomiting  oxyCODONE    IR 5 milliGRAM(s) Oral every 4 hours PRN mild to moderate pain  oxyCODONE    IR 10 milliGRAM(s) Oral every 4 hours PRN Severe Pain (7 - 10)      Vital Signs Last 24 Hrs  T(C): 36.6 (10 Feb 2020 09:23), Max: 36.7 (09 Feb 2020 21:26)  T(F): 97.8 (10 Feb 2020 09:23), Max: 98.1 (09 Feb 2020 21:26)  HR: 76 (10 Feb 2020 09:23) (54 - 76)  BP: 143/42 (10 Feb 2020 09:23) (105/80 - 143/42)  BP(mean): --  RR: 16 (10 Feb 2020 09:23) (16 - 17)  SpO2: 100% (10 Feb 2020 09:23) (100% - 100%)  CAPILLARY BLOOD GLUCOSE        I&O's Summary    09 Feb 2020 07:01  -  10 Feb 2020 07:00  --------------------------------------------------------  IN: 0 mL / OUT: 1000 mL / NET: -1000 mL    10 Feb 2020 07:01  -  10 Feb 2020 12:45  --------------------------------------------------------  IN: 360 mL / OUT: 0 mL / NET: 360 mL        PHYSICAL EXAM:  GENERAL: NAD, well-developed  HEAD:  Atraumatic, Normocephalic  EYES: EOMI, PERRLA, conjunctiva and sclera clear  NECK: Supple, No JVD  CHEST/LUNG: Clear to auscultation bilaterally; No wheeze  HEART: Regular rate and rhythm; No murmurs, rubs, or gallops  ABDOMEN: Soft, Nontender, Nondistended; Bowel sounds present  EXTREMITIES:  2+ Peripheral Pulses, No clubbing, cyanosis, or edema  PSYCH: AAOx3  NEUROLOGY: non-focal  SKIN: No rashes or lesions    LABS:                        8.2    9.17  )-----------( 134      ( 09 Feb 2020 05:45 )             24.5     02-10    131<L>  |  100  |  9   ----------------------------<  87  4.8   |  22  |  0.80    Ca    8.5      10 Feb 2020 05:40                RADIOLOGY & ADDITIONAL TESTS:    Imaging Personally Reviewed:    Consultant(s) Notes Reviewed:      Care Discussed with Consultants/Other Providers:

## 2020-02-10 NOTE — PROGRESS NOTE ADULT - ASSESSMENT
A/P 69y year old Female s/p R  TKA. Postop pre-renal hyponatremia  - FU AM BMP, urine lytes  - salt tabs  - Pain Control  - WBAT  - PT/OOB  - DVT PPx  - Dispo Planning: home if sodium uptrends as per hospitalist recs; home with no salt tabs or HCTZ

## 2020-02-10 NOTE — PROGRESS NOTE ADULT - SUBJECTIVE AND OBJECTIVE BOX
S: Patient seen and examined at bedside. Reports no acute complaints at this time. Pain is well controlled.    O:   ICU Vital Signs Last 24 Hrs  T(C): 36.3 (10 Feb 2020 05:21), Max: 36.7 (09 Feb 2020 21:26)  T(F): 97.4 (10 Feb 2020 05:21), Max: 98.1 (09 Feb 2020 21:26)  HR: 54 (10 Feb 2020 05:21) (54 - 65)  BP: 119/53 (10 Feb 2020 05:21) (105/80 - 139/70)  BP(mean): --  ABP: --  ABP(mean): --  RR: 17 (10 Feb 2020 05:21) (16 - 18)  SpO2: 100% (10 Feb 2020 05:21) (100% - 100%)                          8.2    9.17  )-----------( 134      ( 09 Feb 2020 05:45 )             24.5   02-10    131<L>  |  100  |  9   ----------------------------<  87  4.8   |  22  |  0.80    Ca    8.5      10 Feb 2020 05:40        PHYSICAL EXAM:  Gen: NAD, AAOx3  Right Lower Extremity:  Dressing clean dry intact  +EHL/FHL/TA/GS  SILT L3-S1  +DP/PT Pulses  Compartments soft  No calf TTP B/L

## 2020-02-13 LAB — SURGICAL PATHOLOGY STUDY: SIGNIFICANT CHANGE UP

## 2020-02-19 ENCOUNTER — APPOINTMENT (OUTPATIENT)
Dept: ORTHOPEDIC SURGERY | Facility: CLINIC | Age: 70
End: 2020-02-19
Payer: MEDICAID

## 2020-02-19 PROCEDURE — 99024 POSTOP FOLLOW-UP VISIT: CPT

## 2020-02-21 ENCOUNTER — APPOINTMENT (OUTPATIENT)
Dept: ORTHOPEDIC SURGERY | Facility: CLINIC | Age: 70
End: 2020-02-21

## 2020-03-01 PROCEDURE — G9001: CPT

## 2020-03-06 ENCOUNTER — APPOINTMENT (OUTPATIENT)
Dept: ORTHOPEDIC SURGERY | Facility: CLINIC | Age: 70
End: 2020-03-06
Payer: MEDICAID

## 2020-03-06 DIAGNOSIS — M17.11 UNILATERAL PRIMARY OSTEOARTHRITIS, RIGHT KNEE: ICD-10-CM

## 2020-03-06 PROCEDURE — 99024 POSTOP FOLLOW-UP VISIT: CPT

## 2020-05-08 NOTE — PROGRESS NOTE ADULT - PROVIDER SPECIALTY LIST ADULT
CTA head and neck with evidence of left carotid stenosis 50-70%, small aneurysm on CTA head.. Status post CEA  plavix    Statin    Scopolamine patch started on 5/9     Hospitalist

## 2020-06-10 ENCOUNTER — APPOINTMENT (OUTPATIENT)
Dept: ORTHOPEDIC SURGERY | Facility: CLINIC | Age: 70
End: 2020-06-10
Payer: MEDICAID

## 2020-06-10 VITALS — TEMPERATURE: 99.2 F

## 2020-06-10 DIAGNOSIS — M17.12 UNILATERAL PRIMARY OSTEOARTHRITIS, LEFT KNEE: ICD-10-CM

## 2020-06-10 PROCEDURE — 73562 X-RAY EXAM OF KNEE 3: CPT | Mod: 50

## 2020-06-10 PROCEDURE — 99213 OFFICE O/P EST LOW 20 MIN: CPT

## 2020-09-04 ENCOUNTER — OUTPATIENT (OUTPATIENT)
Dept: OUTPATIENT SERVICES | Facility: HOSPITAL | Age: 70
LOS: 1 days | End: 2020-09-04
Payer: MEDICAID

## 2020-09-04 VITALS
WEIGHT: 149.91 LBS | DIASTOLIC BLOOD PRESSURE: 76 MMHG | HEART RATE: 74 BPM | RESPIRATION RATE: 16 BRPM | OXYGEN SATURATION: 99 % | SYSTOLIC BLOOD PRESSURE: 160 MMHG | TEMPERATURE: 99 F | HEIGHT: 62 IN

## 2020-09-04 DIAGNOSIS — M17.12 UNILATERAL PRIMARY OSTEOARTHRITIS, LEFT KNEE: ICD-10-CM

## 2020-09-04 DIAGNOSIS — I10 ESSENTIAL (PRIMARY) HYPERTENSION: ICD-10-CM

## 2020-09-04 DIAGNOSIS — Z98.49 CATARACT EXTRACTION STATUS, UNSPECIFIED EYE: Chronic | ICD-10-CM

## 2020-09-04 DIAGNOSIS — Z96.651 PRESENCE OF RIGHT ARTIFICIAL KNEE JOINT: Chronic | ICD-10-CM

## 2020-09-04 LAB
ANION GAP SERPL CALC-SCNC: 12 MMO/L — SIGNIFICANT CHANGE UP (ref 7–14)
APPEARANCE UR: CLEAR — SIGNIFICANT CHANGE UP
BACTERIA # UR AUTO: NEGATIVE — SIGNIFICANT CHANGE UP
BILIRUB UR-MCNC: NEGATIVE — SIGNIFICANT CHANGE UP
BLD GP AB SCN SERPL QL: NEGATIVE — SIGNIFICANT CHANGE UP
BLOOD UR QL VISUAL: NEGATIVE — SIGNIFICANT CHANGE UP
BUN SERPL-MCNC: 23 MG/DL — SIGNIFICANT CHANGE UP (ref 7–23)
CALCIUM SERPL-MCNC: 9.2 MG/DL — SIGNIFICANT CHANGE UP (ref 8.4–10.5)
CHLORIDE SERPL-SCNC: 93 MMOL/L — LOW (ref 98–107)
CO2 SERPL-SCNC: 23 MMOL/L — SIGNIFICANT CHANGE UP (ref 22–31)
COLOR SPEC: SIGNIFICANT CHANGE UP
CREAT SERPL-MCNC: 0.96 MG/DL — SIGNIFICANT CHANGE UP (ref 0.5–1.3)
GLUCOSE SERPL-MCNC: 103 MG/DL — HIGH (ref 70–99)
GLUCOSE UR-MCNC: NEGATIVE — SIGNIFICANT CHANGE UP
HCT VFR BLD CALC: 33.1 % — LOW (ref 34.5–45)
HGB BLD-MCNC: 10.5 G/DL — LOW (ref 11.5–15.5)
HYALINE CASTS # UR AUTO: NEGATIVE — SIGNIFICANT CHANGE UP
KETONES UR-MCNC: NEGATIVE — SIGNIFICANT CHANGE UP
LEUKOCYTE ESTERASE UR-ACNC: SIGNIFICANT CHANGE UP
MCHC RBC-ENTMCNC: 25.5 PG — LOW (ref 27–34)
MCHC RBC-ENTMCNC: 31.7 % — LOW (ref 32–36)
MCV RBC AUTO: 80.3 FL — SIGNIFICANT CHANGE UP (ref 80–100)
NITRITE UR-MCNC: NEGATIVE — SIGNIFICANT CHANGE UP
NRBC # FLD: 0 K/UL — SIGNIFICANT CHANGE UP (ref 0–0)
PH UR: 6 — SIGNIFICANT CHANGE UP (ref 5–8)
PLATELET # BLD AUTO: 227 K/UL — SIGNIFICANT CHANGE UP (ref 150–400)
PMV BLD: 11.7 FL — SIGNIFICANT CHANGE UP (ref 7–13)
POTASSIUM SERPL-MCNC: 4.4 MMOL/L — SIGNIFICANT CHANGE UP (ref 3.5–5.3)
POTASSIUM SERPL-SCNC: 4.4 MMOL/L — SIGNIFICANT CHANGE UP (ref 3.5–5.3)
PROT UR-MCNC: NEGATIVE — SIGNIFICANT CHANGE UP
RBC # BLD: 4.12 M/UL — SIGNIFICANT CHANGE UP (ref 3.8–5.2)
RBC # FLD: 14.5 % — SIGNIFICANT CHANGE UP (ref 10.3–14.5)
RBC CASTS # UR COMP ASSIST: SIGNIFICANT CHANGE UP (ref 0–?)
RH IG SCN BLD-IMP: POSITIVE — SIGNIFICANT CHANGE UP
SODIUM SERPL-SCNC: 128 MMOL/L — LOW (ref 135–145)
SP GR SPEC: 1.01 — SIGNIFICANT CHANGE UP (ref 1–1.04)
SQUAMOUS # UR AUTO: SIGNIFICANT CHANGE UP
UROBILINOGEN FLD QL: NORMAL — SIGNIFICANT CHANGE UP
WBC # BLD: 7.02 K/UL — SIGNIFICANT CHANGE UP (ref 3.8–10.5)
WBC # FLD AUTO: 7.02 K/UL — SIGNIFICANT CHANGE UP (ref 3.8–10.5)
WBC UR QL: SIGNIFICANT CHANGE UP (ref 0–?)

## 2020-09-04 PROCEDURE — 93010 ELECTROCARDIOGRAM REPORT: CPT

## 2020-09-04 RX ORDER — RANITIDINE HYDROCHLORIDE 150 MG/1
1 TABLET, FILM COATED ORAL
Qty: 0 | Refills: 0 | DISCHARGE

## 2020-09-04 RX ORDER — METOPROLOL TARTRATE 50 MG
0 TABLET ORAL
Qty: 0 | Refills: 0 | DISCHARGE

## 2020-09-04 NOTE — H&P PST ADULT - NSICDXPASTMEDICALHX_GEN_ALL_CORE_FT
PAST MEDICAL HISTORY:  Chronic GERD     Hypertension     Osteoarthritis     Unilateral primary osteoarthritis, left knee

## 2020-09-04 NOTE — H&P PST ADULT - MUSCULOSKELETAL COMMENTS
bilateral knee pain worsens with movement left knee OA. see HPI crepitus with flexion and extension of left knee

## 2020-09-04 NOTE — H&P PST ADULT - HISTORY OF PRESENT ILLNESS
69 y/o Daniel speaking female with HTN presents to PST preop for left total knee replacement on 9/18/20. preop dx of unilateral primary osteoarthritis, left knee. h/o bilateral knee pain for 5 years. pt s/p right replacement february 2020. pt reports 10/10 pain to the left knee with walking and stair climbing. now for surgery.   pt offered [facility] but refused asking we use her son for PST interview.

## 2020-09-04 NOTE — H&P PST ADULT - NSICDXPASTSURGICALHX_GEN_ALL_CORE_FT
PAST SURGICAL HISTORY:  H/O total knee replacement, right 2/2020    S/P cataract extraction bilateral

## 2020-09-04 NOTE — H&P PST ADULT - OTHER CARE PROVIDERS
Cardiologist Dr. Enrique Soto [phone]- pt referred to cardiologist by PCP. Will schedule appt for clearance

## 2020-09-04 NOTE — H&P PST ADULT - CENTRAL VENOUS CATHETER
Detail Level: Detailed
Body Location Override (Optional - Billing Will Still Be Based On Selected Body Map Location If Applicable): superior mid forehead
no

## 2020-09-04 NOTE — H&P PST ADULT - NEUROLOGICAL DETAILS
sensation intact/cranial nerves intact/normal strength/alert and oriented x 3 responds to pain/responds to verbal commands/sensation intact/cranial nerves intact/normal strength/alert and oriented x 3

## 2020-09-04 NOTE — H&P PST ADULT - NSICDXPROBLEM_GEN_ALL_CORE_FT
PROBLEM DIAGNOSES  Problem: Unilateral primary osteoarthritis, left knee  Assessment and Plan: preop for left total knee replacement on 9/18/20  preop instructions given, pt and son verbalized understanding   GI prophylaxis and chlorhexidine wash provided   pt scheduled for COVID testing on 9/15/20    Problem: HTN (hypertension)  Assessment and Plan: Pt will take metoprolol AM of surgery   /76 cardiac clearance requested. PROBLEM DIAGNOSES  Problem: Unilateral primary osteoarthritis, left knee  Assessment and Plan: preop for left total knee replacement on 9/18/20  preop instructions given, pt and son verbalized understanding   GI prophylaxis and chlorhexidine wash provided   pt scheduled for COVID testing on 9/15/20    Problem: HTN (hypertension)  Assessment and Plan: Pt will take metoprolol AM of surgery   /76- cardiac clearance requested.

## 2020-09-04 NOTE — H&P PST ADULT - RS GEN PE MLT RESP DETAILS PC
no wheezes/respirations non-labored/clear to auscultation bilaterally/no rhonchi/no rales breath sounds equal/respirations non-labored/clear to auscultation bilaterally/no wheezes

## 2020-09-05 LAB
CULTURE RESULTS: SIGNIFICANT CHANGE UP
SPECIMEN SOURCE: SIGNIFICANT CHANGE UP

## 2020-09-14 DIAGNOSIS — Z01.818 ENCOUNTER FOR OTHER PREPROCEDURAL EXAMINATION: ICD-10-CM

## 2020-09-15 ENCOUNTER — APPOINTMENT (OUTPATIENT)
Dept: DISASTER EMERGENCY | Facility: CLINIC | Age: 70
End: 2020-09-15

## 2020-09-16 LAB — SARS-COV-2 N GENE NPH QL NAA+PROBE: NOT DETECTED

## 2020-09-17 NOTE — ASU PATIENT PROFILE, ADULT - ATTEMPT TO OOB
Patient:   SUZANNE CINTRON            MRN: [id number]            FIN: [id number]              LINDA Maldonado Hospitalist   no

## 2020-09-18 ENCOUNTER — INPATIENT (INPATIENT)
Facility: HOSPITAL | Age: 70
LOS: 0 days | Discharge: HOME | End: 2020-09-18
Attending: ORTHOPAEDIC SURGERY | Admitting: ORTHOPAEDIC SURGERY

## 2020-09-18 ENCOUNTER — APPOINTMENT (OUTPATIENT)
Dept: ORTHOPEDIC SURGERY | Facility: HOSPITAL | Age: 70
End: 2020-09-18

## 2020-09-18 VITALS
TEMPERATURE: 98 F | SYSTOLIC BLOOD PRESSURE: 180 MMHG | DIASTOLIC BLOOD PRESSURE: 63 MMHG | HEIGHT: 63 IN | WEIGHT: 139.99 LBS | RESPIRATION RATE: 15 BRPM | OXYGEN SATURATION: 100 % | HEART RATE: 53 BPM

## 2020-09-18 DIAGNOSIS — M17.12 UNILATERAL PRIMARY OSTEOARTHRITIS, LEFT KNEE: ICD-10-CM

## 2020-09-18 DIAGNOSIS — Z98.49 CATARACT EXTRACTION STATUS, UNSPECIFIED EYE: Chronic | ICD-10-CM

## 2020-09-18 DIAGNOSIS — Z96.651 PRESENCE OF RIGHT ARTIFICIAL KNEE JOINT: Chronic | ICD-10-CM

## 2020-09-18 LAB
ALBUMIN SERPL ELPH-MCNC: 4.5 G/DL — SIGNIFICANT CHANGE UP (ref 3.3–5)
ALP SERPL-CCNC: 95 U/L — SIGNIFICANT CHANGE UP (ref 40–120)
ALT FLD-CCNC: 15 U/L — SIGNIFICANT CHANGE UP (ref 4–33)
ANION GAP SERPL CALC-SCNC: 13 MMO/L — SIGNIFICANT CHANGE UP (ref 7–14)
AST SERPL-CCNC: 23 U/L — SIGNIFICANT CHANGE UP (ref 4–32)
BILIRUB SERPL-MCNC: 0.4 MG/DL — SIGNIFICANT CHANGE UP (ref 0.2–1.2)
BUN SERPL-MCNC: 18 MG/DL — SIGNIFICANT CHANGE UP (ref 7–23)
CALCIUM SERPL-MCNC: 9.4 MG/DL — SIGNIFICANT CHANGE UP (ref 8.4–10.5)
CHLORIDE SERPL-SCNC: 90 MMOL/L — LOW (ref 98–107)
CO2 SERPL-SCNC: 24 MMOL/L — SIGNIFICANT CHANGE UP (ref 22–31)
CREAT SERPL-MCNC: 0.9 MG/DL — SIGNIFICANT CHANGE UP (ref 0.5–1.3)
GAS PNL BLDV: 125 MMOL/L — LOW (ref 136–146)
GLUCOSE BLDV-MCNC: 110 MG/DL — HIGH (ref 70–99)
GLUCOSE SERPL-MCNC: 108 MG/DL — HIGH (ref 70–99)
HCT VFR BLDV CALC: 33.4 % — LOW (ref 34.5–45)
POTASSIUM BLDV-SCNC: 4.1 MMOL/L — SIGNIFICANT CHANGE UP (ref 3.4–4.5)
POTASSIUM SERPL-MCNC: 4.2 MMOL/L — SIGNIFICANT CHANGE UP (ref 3.5–5.3)
POTASSIUM SERPL-SCNC: 4.2 MMOL/L — SIGNIFICANT CHANGE UP (ref 3.5–5.3)
PROT SERPL-MCNC: 7.7 G/DL — SIGNIFICANT CHANGE UP (ref 6–8.3)
SODIUM SERPL-SCNC: 127 MMOL/L — LOW (ref 135–145)

## 2020-09-18 RX ORDER — PANTOPRAZOLE SODIUM 20 MG/1
40 TABLET, DELAYED RELEASE ORAL ONCE
Refills: 0 | Status: COMPLETED | OUTPATIENT
Start: 2020-09-18 | End: 2020-09-18

## 2020-09-18 RX ORDER — ACETAMINOPHEN 500 MG
975 TABLET ORAL ONCE
Refills: 0 | Status: COMPLETED | OUTPATIENT
Start: 2020-09-18 | End: 2020-09-18

## 2020-09-18 RX ORDER — SODIUM CHLORIDE 9 MG/ML
1000 INJECTION, SOLUTION INTRAVENOUS
Refills: 0 | Status: DISCONTINUED | OUTPATIENT
Start: 2020-09-18 | End: 2020-09-18

## 2020-09-18 RX ORDER — SODIUM CHLORIDE 9 MG/ML
3 INJECTION INTRAMUSCULAR; INTRAVENOUS; SUBCUTANEOUS EVERY 8 HOURS
Refills: 0 | Status: DISCONTINUED | OUTPATIENT
Start: 2020-09-18 | End: 2020-09-18

## 2020-09-18 RX ORDER — TRAMADOL HYDROCHLORIDE 50 MG/1
50 TABLET ORAL ONCE
Refills: 0 | Status: DISCONTINUED | OUTPATIENT
Start: 2020-09-18 | End: 2020-09-18

## 2020-09-18 RX ADMIN — PANTOPRAZOLE SODIUM 40 MILLIGRAM(S): 20 TABLET, DELAYED RELEASE ORAL at 07:23

## 2020-09-18 RX ADMIN — Medication 975 MILLIGRAM(S): at 07:23

## 2020-09-18 RX ADMIN — Medication 75 MILLIGRAM(S): at 07:24

## 2020-09-18 RX ADMIN — TRAMADOL HYDROCHLORIDE 50 MILLIGRAM(S): 50 TABLET ORAL at 07:23

## 2020-09-18 NOTE — ASU PREOP CHECKLIST - NEEDLE GAUGE
20 patient's labs consistent with possible viral illness. has no systemic s.s of sepsis, vs wnl, appears nontoxic, tolerating POs. I expressed concern for viral meningitis, rule out also ICH. ct brain wnl. patient has a normal neuro exam with no meningismus on clinical exam. initially agreed to LP, and then refused LP. mother at bedside. I explained risks of refusing and going home with possible viral meningitis, which could include worsening neurological exam, mentation, worsening of his condition, which could require return to the ed. mother and patient, noting he could be discharged. mother notes she will return if he worsens. given strict return precautions. verbalized understanding.

## 2020-09-29 PROBLEM — M17.12 UNILATERAL PRIMARY OSTEOARTHRITIS, LEFT KNEE: Chronic | Status: ACTIVE | Noted: 2020-09-04

## 2020-09-29 NOTE — ASU PATIENT PROFILE, ADULT - PSH
1.45 second pause, sinus 72 on telemetry 1.45 second pause, sinus 72 on telemetry, patient at dialysis H/O total knee replacement, 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  S/P cataract extraction  bilateral

## 2020-10-02 ENCOUNTER — APPOINTMENT (OUTPATIENT)
Dept: ORTHOPEDIC SURGERY | Facility: CLINIC | Age: 70
End: 2020-10-02

## 2020-11-13 ENCOUNTER — OUTPATIENT (OUTPATIENT)
Dept: OUTPATIENT SERVICES | Facility: HOSPITAL | Age: 70
LOS: 1 days | End: 2020-11-13
Payer: MEDICAID

## 2020-11-13 VITALS
HEART RATE: 68 BPM | RESPIRATION RATE: 16 BRPM | SYSTOLIC BLOOD PRESSURE: 140 MMHG | WEIGHT: 151.9 LBS | TEMPERATURE: 97 F | DIASTOLIC BLOOD PRESSURE: 86 MMHG | HEIGHT: 61 IN | OXYGEN SATURATION: 99 %

## 2020-11-13 DIAGNOSIS — M17.12 UNILATERAL PRIMARY OSTEOARTHRITIS, LEFT KNEE: ICD-10-CM

## 2020-11-13 DIAGNOSIS — I10 ESSENTIAL (PRIMARY) HYPERTENSION: ICD-10-CM

## 2020-11-13 DIAGNOSIS — Z96.651 PRESENCE OF RIGHT ARTIFICIAL KNEE JOINT: Chronic | ICD-10-CM

## 2020-11-13 DIAGNOSIS — Z98.49 CATARACT EXTRACTION STATUS, UNSPECIFIED EYE: Chronic | ICD-10-CM

## 2020-11-13 DIAGNOSIS — G47.33 OBSTRUCTIVE SLEEP APNEA (ADULT) (PEDIATRIC): ICD-10-CM

## 2020-11-13 LAB
ANION GAP SERPL CALC-SCNC: 8 MMO/L — SIGNIFICANT CHANGE UP (ref 7–14)
APPEARANCE UR: CLEAR — SIGNIFICANT CHANGE UP
BILIRUB UR-MCNC: NEGATIVE — SIGNIFICANT CHANGE UP
BLD GP AB SCN SERPL QL: NEGATIVE — SIGNIFICANT CHANGE UP
BLOOD UR QL VISUAL: NEGATIVE — SIGNIFICANT CHANGE UP
BUN SERPL-MCNC: 15 MG/DL — SIGNIFICANT CHANGE UP (ref 7–23)
CALCIUM SERPL-MCNC: 9.7 MG/DL — SIGNIFICANT CHANGE UP (ref 8.4–10.5)
CHLORIDE SERPL-SCNC: 99 MMOL/L — SIGNIFICANT CHANGE UP (ref 98–107)
CO2 SERPL-SCNC: 26 MMOL/L — SIGNIFICANT CHANGE UP (ref 22–31)
COLOR SPEC: SIGNIFICANT CHANGE UP
CREAT SERPL-MCNC: 0.88 MG/DL — SIGNIFICANT CHANGE UP (ref 0.5–1.3)
GLUCOSE SERPL-MCNC: 106 MG/DL — HIGH (ref 70–99)
GLUCOSE UR-MCNC: NEGATIVE — SIGNIFICANT CHANGE UP
HCT VFR BLD CALC: 36.8 % — SIGNIFICANT CHANGE UP (ref 34.5–45)
HGB BLD-MCNC: 11.3 G/DL — LOW (ref 11.5–15.5)
KETONES UR-MCNC: NEGATIVE — SIGNIFICANT CHANGE UP
LEUKOCYTE ESTERASE UR-ACNC: NEGATIVE — SIGNIFICANT CHANGE UP
MCHC RBC-ENTMCNC: 25.7 PG — LOW (ref 27–34)
MCHC RBC-ENTMCNC: 30.7 % — LOW (ref 32–36)
MCV RBC AUTO: 83.8 FL — SIGNIFICANT CHANGE UP (ref 80–100)
NITRITE UR-MCNC: NEGATIVE — SIGNIFICANT CHANGE UP
NRBC # FLD: 0 K/UL — SIGNIFICANT CHANGE UP (ref 0–0)
PH UR: 7 — SIGNIFICANT CHANGE UP (ref 5–8)
PLATELET # BLD AUTO: 179 K/UL — SIGNIFICANT CHANGE UP (ref 150–400)
PMV BLD: 12.2 FL — SIGNIFICANT CHANGE UP (ref 7–13)
POTASSIUM SERPL-MCNC: 4.5 MMOL/L — SIGNIFICANT CHANGE UP (ref 3.5–5.3)
POTASSIUM SERPL-SCNC: 4.5 MMOL/L — SIGNIFICANT CHANGE UP (ref 3.5–5.3)
PROT UR-MCNC: NEGATIVE — SIGNIFICANT CHANGE UP
RBC # BLD: 4.39 M/UL — SIGNIFICANT CHANGE UP (ref 3.8–5.2)
RBC # FLD: 14.3 % — SIGNIFICANT CHANGE UP (ref 10.3–14.5)
RH IG SCN BLD-IMP: POSITIVE — SIGNIFICANT CHANGE UP
SODIUM SERPL-SCNC: 133 MMOL/L — LOW (ref 135–145)
SP GR SPEC: 1.01 — SIGNIFICANT CHANGE UP (ref 1–1.04)
UROBILINOGEN FLD QL: NORMAL — SIGNIFICANT CHANGE UP
WBC # BLD: 9.09 K/UL — SIGNIFICANT CHANGE UP (ref 3.8–10.5)
WBC # FLD AUTO: 9.09 K/UL — SIGNIFICANT CHANGE UP (ref 3.8–10.5)

## 2020-11-13 PROCEDURE — 93010 ELECTROCARDIOGRAM REPORT: CPT

## 2020-11-13 RX ORDER — METOPROLOL TARTRATE 50 MG
1.5 TABLET ORAL
Qty: 0 | Refills: 0 | DISCHARGE

## 2020-11-13 RX ORDER — SODIUM CHLORIDE 9 MG/ML
1000 INJECTION, SOLUTION INTRAVENOUS
Refills: 0 | Status: DISCONTINUED | OUTPATIENT
Start: 2020-11-27 | End: 2020-11-30

## 2020-11-13 RX ORDER — SODIUM CHLORIDE 9 MG/ML
3 INJECTION INTRAMUSCULAR; INTRAVENOUS; SUBCUTANEOUS EVERY 8 HOURS
Refills: 0 | Status: DISCONTINUED | OUTPATIENT
Start: 2020-11-27 | End: 2020-11-30

## 2020-11-13 RX ORDER — METOPROLOL TARTRATE 50 MG
1 TABLET ORAL
Qty: 0 | Refills: 0 | DISCHARGE

## 2020-11-13 RX ORDER — GABAPENTIN 400 MG/1
300 CAPSULE ORAL ONCE
Refills: 0 | Status: COMPLETED | OUTPATIENT
Start: 2020-11-27 | End: 2020-11-27

## 2020-11-13 RX ORDER — ERGOCALCIFEROL 1.25 MG/1
1 CAPSULE ORAL
Qty: 0 | Refills: 0 | DISCHARGE

## 2020-11-13 NOTE — H&P PST ADULT - NEGATIVE GENERAL GENITOURINARY SYMPTOMS
no flank pain R/no urine discoloration/no bladder infections/no renal colic/no urinary hesitancy/no hematuria/no flank pain L

## 2020-11-13 NOTE — H&P PST ADULT - NEGATIVE OPHTHALMOLOGIC SYMPTOMS
no blurred vision L/no blurred vision R/no lacrimation L/no lacrimation R/no discharge L/no diplopia/no photophobia

## 2020-11-13 NOTE — H&P PST ADULT - NSICDXPROBLEM_GEN_ALL_CORE_FT
PROBLEM DIAGNOSES  Problem: Unilateral primary osteoarthritis, left knee  Assessment and Plan: Patient tentatively scheduled for Left total knee replacement on 11/27/20.  Pre-op instructions provided. Pt given verbal and written instructions with teach back on chlorhexidine shampoo and pepcid. Pt verbalized understanding with return demonstration.   Patient instructed to call surgeon's office to schedule a COVID-19 test  prior to procedure.       Problem: HTN (hypertension)  Assessment and Plan: Patient instructed to take with a sip of water on the morning of procedure.   Patient obtained Cardiolgy eval for Low Na levels, -PST requested          PROBLEM DIAGNOSES  Problem: Unilateral primary osteoarthritis, left knee  Assessment and Plan: Patient tentatively scheduled for Left total knee replacement on 11/27/20.  Pre-op instructions provided. Pt given verbal and written instructions with teach back on chlorhexidine shampoo and pepcid. Pt verbalized understanding with return demonstration.   Patient instructed to call surgeon's office to schedule a COVID-19 test  prior to procedure.       Problem: MAXIM (obstructive sleep apnea)  Assessment and Plan: MAXIM precautions, OR booking notified     Problem: HTN (hypertension)  Assessment and Plan: Patient instructed to take with a sip of water on the morning of procedure.   Patient obtained Cardiolgy eval for Low Na levels,, copy in chart

## 2020-11-13 NOTE — H&P PST ADULT - NEGATIVE ENMT SYMPTOMS
no vertigo/no hearing difficulty/no throat pain/no dysphagia/no ear pain/no tinnitus/no nasal discharge

## 2020-11-13 NOTE — H&P PST ADULT - VENOUS THROMBOEMBOLISM BMI
Per [facility] ask NPAL if one of her hold spots in December can be used ? (1) obesity (BMI greater than 25)

## 2020-11-13 NOTE — H&P PST ADULT - DATE/TIME OF ACCEPTANCE
Decrease januvia to 50 mg daily    Please seek medical attention for any worsening medical condition or lack of improvement.             Dr. Willie Easley, Endocrinologist  [facility]   [address] [place] [address]  Ph: [phone]  Fax: [phone]           Hours:   Monday  - 8:30-4:30  Tuesday - 8:30- 4:30  Wednesday - 8:30- 4:30  Friday - 8:30-4:30  Saturday - OFF  Sunday -OFF         [facility]   [id number] [place] [address] [address]   [place] [address]  Ph: [phone]  Fax: [phone]        Hours:   Thursday 8:30-4:30 only           Requirements for follow-up visit, please have your blood work done 5-7 days prior to seeing Dr. Easley if indicated.         If you need a refill on your prescription, please call your pharmacy and let them know. Please be proactive and call before your medication runs out. The pharmacy will then contact us for the refill. Please allow 24-48 hours for the refill to be processed.      If your physician has ordered additional laboratory or radiology testing as part of your ongoing plan of care, please allow 5-7 business days from the day of your lab draw or test for the results to be sent and reviewed by your provider. If your results are critical and require more immediate intervention, you will be contacted sooner. Your results will be conveyed to you via a phone call or letter.      You may be receiving a patient satisfaction survey in the mail or in your email. If you receive an email survey, please look for the subject line of: \" Your provider name\" would like your feedback\". Please take the time to complete your survey either via the mail or email, as your feedback is very important to us. We strive to make your experience exceptional and your comments help us with that goal. We look forward to hearing from you.          CALL clinic if you experience recurrent/frequent low blood sugar less than 70.    ROUTINE DIABETES  INSTRUCTIONS  Please bring your meter to all clinic visits. Do not share your meter with others.    Glucose/sugar goals as below:  Fasting in the morning and times other than after a meal: < 140  2 hours after meals= glucose <180      You should rotate your injection sites to avoid developing scar or fatty build-up in the area of insulin injections. If you develop scar or fatty build up in the area you will not absorb the insulin well.    Do not reuse the needles. Clean the skin with alcohol before the injection.    You should always carry medical alert ID stating that you have diabetes.  You should always carry sugar (glucose tablets) to correct lows sugars.  You should have a glucagon rescue kit if on insulin or medications that increase insulin    DRIVING  Check glucose/sugar levels prior to driving and every 2 hours during long car trips and correct and low glucose/sugar levels before driving.     PREGNANCY  You should let us know immediately if you are pregnant.    DIABETES COMPLICATIONS- PREVENTION MEASURES  You should have a yearly dilated eye exam. Please have the results sent to the Endocrinology Clinic.    You should either perform self foot care or see a podiatrist regularly for foot care. If you develop any sores on your feet that are not healing, call clinic for further instructions.    You should follow a diabetic diet.    You should aim to get 2.5 hours of exercise a week (ie 30 minutes 5 times a week) as you are able.     If you develop muscle aches on the cholesterol medication (statin) you should stop the medication and call the prescribing physician.       Hypoglycemia “Rule of 15/15”   “Rule of 15/15” is an easy way to remember how to treat low glucose.     1) Check your blood glucose:     If your blood glucose is 70-100mg/dL and you are symptomatic (sweaty, shaky, hungry, irritable, tired) or your blood glucose is less than 70mg/dL, take 15 grams of fast acting carbohydrates*.   *Examples of  fast acting carbohydrates are:   3-4 glucose tablets OR  1 tube glucose gel OR   1 cup non-fat milk OR   6oz regular soda OR   4oz (1/2 cup) fruit juice OR   2 teaspoons of sugar or honey.     2) Wait 15 minutes and then recheck your blood glucose. If your blood glucose is not up to 100mg/dL, then repeat the above treatment.     If the symptoms of low blood glucose are gone but it is more than 1 hour away from your meal time, eat a snack of 1 starch and 1 protein such as:  ½ sandwich OR   1oz cheese and 6 crackers OR   1 tbsp peanut butter and 6 crackers OR   4oz non-fat milk and 2 trisha crackers.     Special Notes: Symptoms of Hypoglycemia with normal or high blood sugar.   If you have had high blood sugars for several weeks, your body will adapt and “get used to” these values. When blood sugar is treated and returns to more normal levels, symptoms of low blood sugar can occur even with normal blood sugars values (>70mg/dL). This condition is not dangerous and does not require treatment, as long as the blood sugar stays above 60mg/dL. In fact, it is best to try not to treat these symptoms as much as possible. This will allow the body to “reset” and eventually these symptoms will not occur with normal blood sugar values anymore. If you must eat because the symptoms are too severe, try to eat as little as possible to make the symptoms tolerable (try 10- 15grams of carbohydrates or less). If you always eat enough to return the blood sugar to high values, the symptoms with normal sugar values will not go away and you will not be able to have good control of you blood sugar.   Hypoglycemia unawareness   If you have had frequent low blood sugar reactions, you may not develop typical symptoms (a condition called hypoglycemia unawareness and instead develop confusion, slurred speech, and drowsiness. Hypoglycemia unawareness can be dangerous, because confusion may keep you from treating the low blood sugar appropriately.  This can allow the low blood sugar to become more severe and result in serious problems like passing out or having seizures. If you think you have hypoglycemia unawareness, please do not drive and discuss this with your doctor right away. For people who have this condition and cannot treat their low glucose without help, a medicine called glucagon needs to be used. By injecting glucagon (which comes in an emergency kit), a spouse/partner/friend/coworker can help to bring the glucose level back up to a normal range when the person with diabetes is unable or unwilling to safely eat carbohydrates.      LONG-TERM COMPLICATIONS OF POORLY CONTROLLED DIABETES  Diabetes is a disease which can negatively affect vision causing blindness, affect kidneys causing failure and need for dialysis, affect nerves leading to numbness/tingling and inability to tell when injury occurs, affect the cardiovascular system leading to heart attacks, affect the cerebrovascular system (brain blood vessels) leading to stroke, affect the blood vessels to arms and legs leading to issues with poor blood flow to those areas leading to wounds which don't heal and infection (this could lead to amputations).  Poorly controlled diabetes can lead to loss of limb and life.  You are doing the correct thing when you stay engaged with your medical care.         Call clinic if you have concerns about your blood glucose levels between visits.             13-Nov-2020 09:40

## 2020-11-13 NOTE — H&P PST ADULT - RS GEN PE MLT RESP DETAILS PC
breath sounds equal/clear to auscultation bilaterally/no wheezes/respirations non-labored/no rhonchi/no rales

## 2020-11-13 NOTE — H&P PST ADULT - NEGATIVE NEUROLOGICAL SYMPTOMS
no tremors/no focal seizures/no difficulty walking/no paresthesias/no transient paralysis/no weakness/no generalized seizures

## 2020-11-13 NOTE — H&P PST ADULT - HISTORY OF PRESENT ILLNESS
71 y/o Daniel speaking female with HTN presents to PST preop for left total knee replacement on preop dx of unilateral primary osteoarthritis, left knee. h/o bilateral knee pain for 5 years. pt s/p right replacement february 2020. pt reports 10/10 pain to the left knee with walking and stair climbing. now for surgery. Was scheduled initially in September 2020 for surgery but got cancelled due to low Na levels. Followed up with PCP. Patient was on low salt diet, then  changed to regular diet. Son states Na level now to 130s. 71 y/o Daniel speaking female with HTN presents to PST preop for left total knee replacement on preop dx of unilateral primary osteoarthritis, left knee. h/o bilateral knee pain for 5 years. pt s/p right replacement february 2020. pt reports 10/10 pain to the left knee with walking and stair climbing. now for surgery. Was scheduled initially in September 2020 for surgery but got cancelled due to low Na levels. Followed up with PCP. Patient was on low salt diet, then  changed to regular diet. Recent Na levels 139.

## 2020-11-14 LAB
CULTURE RESULTS: SIGNIFICANT CHANGE UP
MRSA PCR RESULT.: SIGNIFICANT CHANGE UP
S AUREUS DNA NOSE QL NAA+PROBE: NOT DETECTED — SIGNIFICANT CHANGE UP
SPECIMEN SOURCE: SIGNIFICANT CHANGE UP

## 2020-11-24 ENCOUNTER — APPOINTMENT (OUTPATIENT)
Dept: DISASTER EMERGENCY | Facility: CLINIC | Age: 70
End: 2020-11-24

## 2020-11-26 ENCOUNTER — TRANSCRIPTION ENCOUNTER (OUTPATIENT)
Age: 70
End: 2020-11-26

## 2020-11-26 LAB — SARS-COV-2 N GENE NPH QL NAA+PROBE: NOT DETECTED

## 2020-11-27 ENCOUNTER — TRANSCRIPTION ENCOUNTER (OUTPATIENT)
Age: 70
End: 2020-11-27

## 2020-11-27 ENCOUNTER — RESULT REVIEW (OUTPATIENT)
Age: 70
End: 2020-11-27

## 2020-11-27 ENCOUNTER — INPATIENT (INPATIENT)
Facility: HOSPITAL | Age: 70
LOS: 2 days | Discharge: HOME HEALTH | End: 2020-11-30
Attending: ORTHOPAEDIC SURGERY | Admitting: ORTHOPAEDIC SURGERY
Payer: MEDICAID

## 2020-11-27 ENCOUNTER — APPOINTMENT (OUTPATIENT)
Dept: ORTHOPEDIC SURGERY | Facility: HOSPITAL | Age: 70
End: 2020-11-27

## 2020-11-27 VITALS
OXYGEN SATURATION: 99 % | TEMPERATURE: 98 F | DIASTOLIC BLOOD PRESSURE: 66 MMHG | WEIGHT: 151.9 LBS | HEART RATE: 61 BPM | SYSTOLIC BLOOD PRESSURE: 165 MMHG | RESPIRATION RATE: 17 BRPM | HEIGHT: 61 IN

## 2020-11-27 DIAGNOSIS — Z98.49 CATARACT EXTRACTION STATUS, UNSPECIFIED EYE: Chronic | ICD-10-CM

## 2020-11-27 DIAGNOSIS — M17.12 UNILATERAL PRIMARY OSTEOARTHRITIS, LEFT KNEE: ICD-10-CM

## 2020-11-27 DIAGNOSIS — Z96.651 PRESENCE OF RIGHT ARTIFICIAL KNEE JOINT: Chronic | ICD-10-CM

## 2020-11-27 LAB
ANION GAP SERPL CALC-SCNC: 9 MMO/L — SIGNIFICANT CHANGE UP (ref 7–14)
BUN SERPL-MCNC: 14 MG/DL — SIGNIFICANT CHANGE UP (ref 7–23)
CALCIUM SERPL-MCNC: 9 MG/DL — SIGNIFICANT CHANGE UP (ref 8.4–10.5)
CHLORIDE SERPL-SCNC: 103 MMOL/L — SIGNIFICANT CHANGE UP (ref 98–107)
CO2 SERPL-SCNC: 22 MMOL/L — SIGNIFICANT CHANGE UP (ref 22–31)
CREAT SERPL-MCNC: 0.97 MG/DL — SIGNIFICANT CHANGE UP (ref 0.5–1.3)
GLUCOSE SERPL-MCNC: 117 MG/DL — HIGH (ref 70–99)
HCT VFR BLD CALC: 32.5 % — LOW (ref 34.5–45)
HGB BLD-MCNC: 10.1 G/DL — LOW (ref 11.5–15.5)
MCHC RBC-ENTMCNC: 26.2 PG — LOW (ref 27–34)
MCHC RBC-ENTMCNC: 31.1 % — LOW (ref 32–36)
MCV RBC AUTO: 84.2 FL — SIGNIFICANT CHANGE UP (ref 80–100)
NRBC # FLD: 0 K/UL — SIGNIFICANT CHANGE UP (ref 0–0)
PLATELET # BLD AUTO: 150 K/UL — SIGNIFICANT CHANGE UP (ref 150–400)
PMV BLD: 12 FL — SIGNIFICANT CHANGE UP (ref 7–13)
POTASSIUM SERPL-MCNC: 4.9 MMOL/L — SIGNIFICANT CHANGE UP (ref 3.5–5.3)
POTASSIUM SERPL-SCNC: 4.9 MMOL/L — SIGNIFICANT CHANGE UP (ref 3.5–5.3)
RBC # BLD: 3.86 M/UL — SIGNIFICANT CHANGE UP (ref 3.8–5.2)
RBC # FLD: 14 % — SIGNIFICANT CHANGE UP (ref 10.3–14.5)
SODIUM SERPL-SCNC: 134 MMOL/L — LOW (ref 135–145)
WBC # BLD: 8.48 K/UL — SIGNIFICANT CHANGE UP (ref 3.8–10.5)
WBC # FLD AUTO: 8.48 K/UL — SIGNIFICANT CHANGE UP (ref 3.8–10.5)

## 2020-11-27 PROCEDURE — 27447 TOTAL KNEE ARTHROPLASTY: CPT | Mod: LT

## 2020-11-27 PROCEDURE — 73560 X-RAY EXAM OF KNEE 1 OR 2: CPT | Mod: 26,LT

## 2020-11-27 PROCEDURE — 88305 TISSUE EXAM BY PATHOLOGIST: CPT | Mod: 26

## 2020-11-27 PROCEDURE — 88311 DECALCIFY TISSUE: CPT | Mod: 26

## 2020-11-27 RX ORDER — ERGOCALCIFEROL 1.25 MG/1
1 CAPSULE ORAL
Qty: 0 | Refills: 0 | DISCHARGE

## 2020-11-27 RX ORDER — VALSARTAN 80 MG/1
320 TABLET ORAL DAILY
Refills: 0 | Status: DISCONTINUED | OUTPATIENT
Start: 2020-11-27 | End: 2020-11-30

## 2020-11-27 RX ORDER — MAGNESIUM HYDROXIDE 400 MG/1
30 TABLET, CHEWABLE ORAL DAILY
Refills: 0 | Status: DISCONTINUED | OUTPATIENT
Start: 2020-11-27 | End: 2020-11-30

## 2020-11-27 RX ORDER — METOPROLOL TARTRATE 50 MG
1.5 TABLET ORAL
Qty: 0 | Refills: 0 | DISCHARGE

## 2020-11-27 RX ORDER — OXYCODONE HYDROCHLORIDE 5 MG/1
10 TABLET ORAL EVERY 4 HOURS
Refills: 0 | Status: DISCONTINUED | OUTPATIENT
Start: 2020-11-27 | End: 2020-11-30

## 2020-11-27 RX ORDER — METOPROLOL TARTRATE 50 MG
50 TABLET ORAL
Refills: 0 | Status: DISCONTINUED | OUTPATIENT
Start: 2020-11-27 | End: 2020-11-30

## 2020-11-27 RX ORDER — KETOROLAC TROMETHAMINE 30 MG/ML
15 SYRINGE (ML) INJECTION EVERY 6 HOURS
Refills: 0 | Status: DISCONTINUED | OUTPATIENT
Start: 2020-11-28 | End: 2020-11-29

## 2020-11-27 RX ORDER — PANTOPRAZOLE SODIUM 20 MG/1
40 TABLET, DELAYED RELEASE ORAL ONCE
Refills: 0 | Status: COMPLETED | OUTPATIENT
Start: 2020-11-27 | End: 2020-11-27

## 2020-11-27 RX ORDER — SODIUM CHLORIDE 9 MG/ML
500 INJECTION, SOLUTION INTRAVENOUS ONCE
Refills: 0 | Status: COMPLETED | OUTPATIENT
Start: 2020-11-28 | End: 2020-11-28

## 2020-11-27 RX ORDER — GABAPENTIN 400 MG/1
100 CAPSULE ORAL EVERY 8 HOURS
Refills: 0 | Status: DISCONTINUED | OUTPATIENT
Start: 2020-11-28 | End: 2020-11-30

## 2020-11-27 RX ORDER — ASPIRIN/CALCIUM CARB/MAGNESIUM 324 MG
325 TABLET ORAL
Refills: 0 | Status: DISCONTINUED | OUTPATIENT
Start: 2020-11-27 | End: 2020-11-30

## 2020-11-27 RX ORDER — HYDROMORPHONE HYDROCHLORIDE 2 MG/ML
0.5 INJECTION INTRAMUSCULAR; INTRAVENOUS; SUBCUTANEOUS ONCE
Refills: 0 | Status: DISCONTINUED | OUTPATIENT
Start: 2020-11-27 | End: 2020-11-30

## 2020-11-27 RX ORDER — ONDANSETRON 8 MG/1
4 TABLET, FILM COATED ORAL EVERY 6 HOURS
Refills: 0 | Status: DISCONTINUED | OUTPATIENT
Start: 2020-11-27 | End: 2020-11-30

## 2020-11-27 RX ORDER — ACETAMINOPHEN 500 MG
975 TABLET ORAL EVERY 8 HOURS
Refills: 0 | Status: DISCONTINUED | OUTPATIENT
Start: 2020-11-28 | End: 2020-11-30

## 2020-11-27 RX ORDER — AMLODIPINE BESYLATE 2.5 MG/1
1 TABLET ORAL
Qty: 0 | Refills: 0 | DISCHARGE

## 2020-11-27 RX ORDER — ACETAMINOPHEN 500 MG
1000 TABLET ORAL ONCE
Refills: 0 | Status: COMPLETED | OUTPATIENT
Start: 2020-11-27 | End: 2020-11-27

## 2020-11-27 RX ORDER — TRAMADOL HYDROCHLORIDE 50 MG/1
50 TABLET ORAL EVERY 8 HOURS
Refills: 0 | Status: DISCONTINUED | OUTPATIENT
Start: 2020-11-27 | End: 2020-11-30

## 2020-11-27 RX ORDER — VALSARTAN 80 MG/1
1 TABLET ORAL
Qty: 0 | Refills: 0 | DISCHARGE

## 2020-11-27 RX ORDER — SODIUM CHLORIDE 9 MG/ML
500 INJECTION, SOLUTION INTRAVENOUS ONCE
Refills: 0 | Status: COMPLETED | OUTPATIENT
Start: 2020-11-27 | End: 2020-11-27

## 2020-11-27 RX ORDER — ACETAMINOPHEN 500 MG
1000 TABLET ORAL ONCE
Refills: 0 | Status: COMPLETED | OUTPATIENT
Start: 2020-11-28 | End: 2020-11-28

## 2020-11-27 RX ORDER — ACETAMINOPHEN 500 MG
975 TABLET ORAL ONCE
Refills: 0 | Status: COMPLETED | OUTPATIENT
Start: 2020-11-27 | End: 2020-11-27

## 2020-11-27 RX ORDER — PANTOPRAZOLE SODIUM 20 MG/1
40 TABLET, DELAYED RELEASE ORAL
Refills: 0 | Status: DISCONTINUED | OUTPATIENT
Start: 2020-11-28 | End: 2020-11-30

## 2020-11-27 RX ORDER — CEFAZOLIN SODIUM 1 G
2000 VIAL (EA) INJECTION EVERY 8 HOURS
Refills: 0 | Status: COMPLETED | OUTPATIENT
Start: 2020-11-27 | End: 2020-11-28

## 2020-11-27 RX ORDER — OXYCODONE HYDROCHLORIDE 5 MG/1
5 TABLET ORAL EVERY 4 HOURS
Refills: 0 | Status: DISCONTINUED | OUTPATIENT
Start: 2020-11-27 | End: 2020-11-30

## 2020-11-27 RX ORDER — TRAMADOL HYDROCHLORIDE 50 MG/1
50 TABLET ORAL ONCE
Refills: 0 | Status: DISCONTINUED | OUTPATIENT
Start: 2020-11-27 | End: 2020-11-27

## 2020-11-27 RX ORDER — SENNA PLUS 8.6 MG/1
2 TABLET ORAL AT BEDTIME
Refills: 0 | Status: DISCONTINUED | OUTPATIENT
Start: 2020-11-27 | End: 2020-11-30

## 2020-11-27 RX ORDER — SODIUM CHLORIDE 9 MG/ML
1000 INJECTION, SOLUTION INTRAVENOUS
Refills: 0 | Status: DISCONTINUED | OUTPATIENT
Start: 2020-11-27 | End: 2020-11-30

## 2020-11-27 RX ORDER — AMLODIPINE BESYLATE 2.5 MG/1
5 TABLET ORAL DAILY
Refills: 0 | Status: DISCONTINUED | OUTPATIENT
Start: 2020-11-27 | End: 2020-11-30

## 2020-11-27 RX ADMIN — TRAMADOL HYDROCHLORIDE 50 MILLIGRAM(S): 50 TABLET ORAL at 10:56

## 2020-11-27 RX ADMIN — SODIUM CHLORIDE 500 MILLILITER(S): 9 INJECTION, SOLUTION INTRAVENOUS at 15:35

## 2020-11-27 RX ADMIN — SODIUM CHLORIDE 500 MILLILITER(S): 9 INJECTION, SOLUTION INTRAVENOUS at 18:56

## 2020-11-27 RX ADMIN — Medication 15 MILLIGRAM(S): at 23:42

## 2020-11-27 RX ADMIN — Medication 100 MILLIGRAM(S): at 22:05

## 2020-11-27 RX ADMIN — Medication 400 MILLIGRAM(S): at 23:42

## 2020-11-27 RX ADMIN — GABAPENTIN 300 MILLIGRAM(S): 400 CAPSULE ORAL at 16:03

## 2020-11-27 RX ADMIN — SODIUM CHLORIDE 125 MILLILITER(S): 9 INJECTION, SOLUTION INTRAVENOUS at 15:05

## 2020-11-27 RX ADMIN — Medication 325 MILLIGRAM(S): at 19:30

## 2020-11-27 RX ADMIN — PANTOPRAZOLE SODIUM 40 MILLIGRAM(S): 20 TABLET, DELAYED RELEASE ORAL at 10:56

## 2020-11-27 RX ADMIN — TRAMADOL HYDROCHLORIDE 50 MILLIGRAM(S): 50 TABLET ORAL at 22:05

## 2020-11-27 RX ADMIN — SODIUM CHLORIDE 3 MILLILITER(S): 9 INJECTION INTRAMUSCULAR; INTRAVENOUS; SUBCUTANEOUS at 22:04

## 2020-11-27 RX ADMIN — SODIUM CHLORIDE 125 MILLILITER(S): 9 INJECTION, SOLUTION INTRAVENOUS at 18:49

## 2020-11-27 RX ADMIN — Medication 975 MILLIGRAM(S): at 10:55

## 2020-11-27 RX ADMIN — SODIUM CHLORIDE 30 MILLILITER(S): 9 INJECTION, SOLUTION INTRAVENOUS at 11:05

## 2020-11-27 RX ADMIN — Medication 50 MILLIGRAM(S): at 18:48

## 2020-11-27 RX ADMIN — SODIUM CHLORIDE 3 MILLILITER(S): 9 INJECTION INTRAMUSCULAR; INTRAVENOUS; SUBCUTANEOUS at 15:44

## 2020-11-27 NOTE — DISCHARGE NOTE PROVIDER - INSTRUCTIONS
continue diet as was prior to surgery, and as was discussed with PMD resume same diet as prior to surgery

## 2020-11-27 NOTE — PROGRESS NOTE ADULT - SUBJECTIVE AND OBJECTIVE BOX
Ortho Post-Op    Patient was seen and examined at bedside. Denies CP/SOB/Dizziness. Pain is controlled.     Vital Signs Last 24 Hrs  T(C): 37 (27 Nov 2020 16:00), Max: 37 (27 Nov 2020 16:00)  T(F): 98.6 (27 Nov 2020 16:00), Max: 98.6 (27 Nov 2020 16:00)  HR: 57 (27 Nov 2020 16:00) (52 - 61)  BP: 126/89 (27 Nov 2020 16:00) (100/59 - 165/66)  BP(mean): 96 (27 Nov 2020 16:00) (62 - 96)  RR: 13 (27 Nov 2020 16:00) (12 - 17)  SpO2: 100% (27 Nov 2020 16:00) (99% - 100%)    GEN: NAD  LLE: Dressing C/D/I.   Motor intact + EHL/FHL/TA/GS. Sensation is grossly intact distal . Extremity warm. Compartments are soft. DP 1+    Labs:                          10.1   8.48  )-----------( 150      ( 27 Nov 2020 15:26 )             32.5       11-27    134<L>  |  103  |  14  ----------------------------<  117<H>  4.9   |  22  |  0.97    Ca    9.0      27 Nov 2020 15:26        A/P: Patient is a 70y y/o Female s/p L TKA, POD # 0    -Pain control/analgesia  -Inc spirometry  -Venodynes/foot pumps  -F/U AM Labs  -PT/OT/WBAT  -Antibiotic periop-ancef  -Anticoagulation-Asa 325mg BID  -Continue to monitor. Notify ortho with questions.

## 2020-11-27 NOTE — DISCHARGE NOTE PROVIDER - NSDCACTIVITY_GEN_ALL_CORE
Do not drive or operate machinery/Do not make important decisions/Showering allowed/No heavy lifting/straining/Stairs allowed/Walking - Outdoors allowed/Walking - Indoors allowed

## 2020-11-27 NOTE — DISCHARGE NOTE PROVIDER - NSDCFUADDINST_GEN_ALL_CORE_FT
weight bear as tolerated dressing is water resistant but still keep direct stream of water away from the dressing  no swimming or bathing where wound is exposed to water for long periods of time

## 2020-11-27 NOTE — DISCHARGE NOTE PROVIDER - NSDCCPTREATMENT_GEN_ALL_CORE_FT
PRINCIPAL PROCEDURE  Procedure: Total left knee replacement  Findings and Treatment: 69 y/o Female presents to [facility] for orthopedic surgery. Patient s/p left total knee arthroplasty with Dr. Kaur on 11/27/2020. Patient tolerated the procedure well without any intraoperative complications. Patient tolerated physical therapy well, pain is controlled. Pt is weight bearing as tolerated with cane/walker as needed. Seen by medical attending for continuity of care and management and cleared for safe discharge home. Keep dressing/incision clean, dry and intact. Any suture/staples to be removed on post-op day #14 at office visit. Pt is on  mg    for DVT prophylaxis, please take for 6 weeks unless otherwise instructed by your surgeon. Please follow up with Dr. Kaur in 1-2 weeks, call office to make appointment, [phone]. Please follow up with your PMD for continuity of care and management as medications may have changed.       PRINCIPAL PROCEDURE  Procedure: Total left knee replacement  Findings and Treatment: dictated operative note  weight bearing as tolerated to Left leg  call Surgeon's office to make appointment for 1-2 weeks from date of surgery Dr. Kaur [phone]

## 2020-11-27 NOTE — DISCHARGE NOTE PROVIDER - HOSPITAL COURSE
69 y/o Female presents to [facility] for orthopedic surgery. Patient s/p left total knee arthroplasty with Dr. Kaur on 11/27/2020. Patient tolerated the procedure well without any intraoperative complications. Patient tolerated physical therapy well, pain is controlled. Pt is weight bearing as tolerated with cane/walker as needed. Seen by medical attending for continuity of care and management and cleared for safe discharge home. Keep dressing/incision clean, dry and intact. Any suture/staples to be removed on post-op day #14 at office visit. Pt is on  mg    for DVT prophylaxis, please take for 6 weeks unless otherwise instructed by your surgeon. Please follow up with Dr. Kaur in 1-2 weeks, call office to make appointment, [phone]. Please follow up with your PMD for continuity of care and management as medications may have changed. 69 y/o Female presents to [facility] for orthopedic surgery. Patient s/p left total knee arthroplasty with Dr. Kaur on 11/27/2020. Patient tolerated the procedure well without any intraoperative complications. Patient tolerated physical therapy well, pain is controlled. Pt is weight bearing as tolerated with cane/walker as needed. Seen by medical attending for continuity of care and management and cleared for safe discharge home. Keep dressing/incision clean, dry and intact. Any applicable sutures/staples to be removed on post-op day #14 at office visit. Pt is on Aspirin (MUST TAKE WITH FOOD) for anticoagulation, please take for 6 weeks unless otherwise instructed by your surgeon. Please follow up with Dr. Kaur in 1-2 weeks, call office to make appointment, [phone]. Please follow up with your PMD for continuity of care and management as medications may have changed.     Istop reviewed

## 2020-11-27 NOTE — DISCHARGE NOTE PROVIDER - CARE PROVIDER_API CALL
Hector Kaur  [facility]  [address], [address]  [place] [address]  Phone: [phone]  Fax: [phone]  Follow Up Time:

## 2020-11-27 NOTE — ASU PREOP CHECKLIST - 1.
Daniel speaking- Daniel shelton-# [id number] Chaparrita. Pt requests son Termayne at bs to translate/interpret.

## 2020-11-27 NOTE — DISCHARGE NOTE PROVIDER - NSDCMRMEDTOKEN_GEN_ALL_CORE_FT
amLODIPine 5 mg oral tablet: 1 tab(s) orally once a day  Daily Blanche oral tablet: 1 tab(s) orally once a day. last dose  11/13/2020  Metoprolol Tartrate 50 mg oral tablet: 1.5 tab(s) (75 mg) orally 2 times a day  valsartan 320 mg oral tablet: 1 tab(s) orally once a day  11 AM  Vitamin D2 50,000 intl units (1.25 mg) oral capsule: 1 cap(s) orally once a week Saturdays   acetaminophen 650 mg oral tablet, extended release: 1 tab(s) orally every 8 hours MDD:3 tabs/day  amLODIPine 5 mg oral tablet: 1 tab(s) orally once a day  aspirin 325 mg oral delayed release tablet: 1 tab(s) orally 2 times a day MDD:2 tabs / day  Colace 100 mg oral capsule: 1 cap(s) orally every 8 hours MDD:3 tabs/day  Daily Blanche oral tablet: 1 tab(s) orally once a day. last dose  11/13/2020  gabapentin 100 mg oral capsule: 1 cap(s) orally every 8 hours MDD:3 tabs/day  Metoprolol Tartrate 50 mg oral tablet: 1.5 tab(s) (75 mg) orally 2 times a day  Mobic 15 mg oral tablet: 1 tab(s) orally once a day MDD:1 tabs /day  oxyCODONE 5 mg oral tablet: 2 tab(s) orally every 6 hours MDD:1-2 tabs q6 PRN pain  Protonix 40 mg oral delayed release tablet: 1 tab(s) orally once a day MDD:1 tab / day  senna oral tablet: 2 tab(s) orally once a day (at bedtime) MDD:2 tabs/day  traMADol 50 mg oral tablet: 1 tab(s) orally every 8 hours MDD:3 tabs/day  Tylenol 325 mg oral tablet: 1 tab(s) orally every 8 hours MDD:3 tabs/day, to be taken w/ tylenol 650mg for 975mg total dose  valsartan 320 mg oral tablet: 1 tab(s) orally once a day  11 AM  Vitamin D2 50,000 intl units (1.25 mg) oral capsule: 1 cap(s) orally once a week Saturdays

## 2020-11-27 NOTE — DISCHARGE NOTE PROVIDER - NSDCCPCAREPLAN_GEN_ALL_CORE_FT
PRINCIPAL DISCHARGE DIAGNOSIS  Diagnosis: Unilateral primary osteoarthritis, left knee  Assessment and Plan of Treatment:        PRINCIPAL DISCHARGE DIAGNOSIS  Diagnosis: Primary osteoarthritis of left knee  Assessment and Plan of Treatment: 71 y/o Female presents to [facility] for orthopedic surgery. Patient s/begin tapering off as pain decreases left total knee arthroplasty with Dr. Kaur on 11/27/2020. Patient tolerated the procedure well without any intraoperative complications. Patient tolerated physical therapy well, pain is controlled. Pt is weight bearing as tolerated with cane/walker as needed. Seen by medical attending for continuity of care and management and cleared for safe discharge home. Keep dressing/incision clean, dry and intact. Any applicable sutures/staples to be removed on post-dictated operative note day #14 at office visit. Pt is on Aspirin (MUST TAKE WITH FOOD) for anticoagulation, please take for 6 weeks unless otherwise instructed by your surgeon. Please follow up with Dr. Kaur in 1-2 weeks, call office to make appointment, [phone]. Please follow up with your PMD for continuity of care and management as medications may have changed.   [place] reviewed

## 2020-11-28 ENCOUNTER — TRANSCRIPTION ENCOUNTER (OUTPATIENT)
Age: 70
End: 2020-11-28

## 2020-11-28 DIAGNOSIS — I10 ESSENTIAL (PRIMARY) HYPERTENSION: ICD-10-CM

## 2020-11-28 LAB
ANION GAP SERPL CALC-SCNC: 14 MMO/L — SIGNIFICANT CHANGE UP (ref 7–14)
BUN SERPL-MCNC: 15 MG/DL — SIGNIFICANT CHANGE UP (ref 7–23)
CALCIUM SERPL-MCNC: 8.7 MG/DL — SIGNIFICANT CHANGE UP (ref 8.4–10.5)
CHLORIDE SERPL-SCNC: 99 MMOL/L — SIGNIFICANT CHANGE UP (ref 98–107)
CO2 SERPL-SCNC: 20 MMOL/L — LOW (ref 22–31)
CREAT SERPL-MCNC: 0.79 MG/DL — SIGNIFICANT CHANGE UP (ref 0.5–1.3)
GLUCOSE SERPL-MCNC: 201 MG/DL — HIGH (ref 70–99)
HCT VFR BLD CALC: 29 % — LOW (ref 34.5–45)
HGB BLD-MCNC: 9.2 G/DL — LOW (ref 11.5–15.5)
MCHC RBC-ENTMCNC: 26.1 PG — LOW (ref 27–34)
MCHC RBC-ENTMCNC: 31.7 % — LOW (ref 32–36)
MCV RBC AUTO: 82.4 FL — SIGNIFICANT CHANGE UP (ref 80–100)
NRBC # FLD: 0 K/UL — SIGNIFICANT CHANGE UP (ref 0–0)
PLATELET # BLD AUTO: 147 K/UL — LOW (ref 150–400)
PMV BLD: 12.4 FL — SIGNIFICANT CHANGE UP (ref 7–13)
POTASSIUM SERPL-MCNC: 4.5 MMOL/L — SIGNIFICANT CHANGE UP (ref 3.5–5.3)
POTASSIUM SERPL-SCNC: 4.5 MMOL/L — SIGNIFICANT CHANGE UP (ref 3.5–5.3)
RBC # BLD: 3.52 M/UL — LOW (ref 3.8–5.2)
RBC # FLD: 13.8 % — SIGNIFICANT CHANGE UP (ref 10.3–14.5)
SODIUM SERPL-SCNC: 133 MMOL/L — LOW (ref 135–145)
WBC # BLD: 13.01 K/UL — HIGH (ref 3.8–10.5)
WBC # FLD AUTO: 13.01 K/UL — HIGH (ref 3.8–10.5)

## 2020-11-28 PROCEDURE — 99223 1ST HOSP IP/OBS HIGH 75: CPT | Mod: AI

## 2020-11-28 RX ORDER — ASPIRIN/CALCIUM CARB/MAGNESIUM 324 MG
1 TABLET ORAL
Qty: 84 | Refills: 0
Start: 2020-11-28 | End: 2021-01-08

## 2020-11-28 RX ORDER — GABAPENTIN 400 MG/1
1 CAPSULE ORAL
Qty: 42 | Refills: 0
Start: 2020-11-28 | End: 2020-12-11

## 2020-11-28 RX ORDER — ACETAMINOPHEN 500 MG
1 TABLET ORAL
Qty: 42 | Refills: 0
Start: 2020-11-28 | End: 2020-12-11

## 2020-11-28 RX ORDER — MELOXICAM 15 MG/1
1 TABLET ORAL
Qty: 14 | Refills: 0
Start: 2020-11-28 | End: 2020-12-11

## 2020-11-28 RX ORDER — TRAMADOL HYDROCHLORIDE 50 MG/1
1 TABLET ORAL
Qty: 42 | Refills: 0
Start: 2020-11-28 | End: 2020-12-11

## 2020-11-28 RX ORDER — PANTOPRAZOLE SODIUM 20 MG/1
1 TABLET, DELAYED RELEASE ORAL
Qty: 42 | Refills: 0
Start: 2020-11-28 | End: 2021-01-08

## 2020-11-28 RX ORDER — OXYCODONE HYDROCHLORIDE 5 MG/1
2 TABLET ORAL
Qty: 56 | Refills: 0
Start: 2020-11-28 | End: 2020-12-04

## 2020-11-28 RX ORDER — DOCUSATE SODIUM 100 MG
1 CAPSULE ORAL
Qty: 42 | Refills: 0
Start: 2020-11-28 | End: 2020-12-11

## 2020-11-28 RX ORDER — SENNA PLUS 8.6 MG/1
2 TABLET ORAL
Qty: 28 | Refills: 0
Start: 2020-11-28 | End: 2020-12-11

## 2020-11-28 RX ADMIN — Medication 325 MILLIGRAM(S): at 07:32

## 2020-11-28 RX ADMIN — Medication 15 MILLIGRAM(S): at 11:26

## 2020-11-28 RX ADMIN — Medication 50 MILLIGRAM(S): at 06:18

## 2020-11-28 RX ADMIN — SODIUM CHLORIDE 3 MILLILITER(S): 9 INJECTION INTRAMUSCULAR; INTRAVENOUS; SUBCUTANEOUS at 14:00

## 2020-11-28 RX ADMIN — Medication 975 MILLIGRAM(S): at 23:18

## 2020-11-28 RX ADMIN — Medication 15 MILLIGRAM(S): at 23:18

## 2020-11-28 RX ADMIN — TRAMADOL HYDROCHLORIDE 50 MILLIGRAM(S): 50 TABLET ORAL at 14:44

## 2020-11-28 RX ADMIN — SODIUM CHLORIDE 500 MILLILITER(S): 9 INJECTION, SOLUTION INTRAVENOUS at 08:22

## 2020-11-28 RX ADMIN — Medication 100 MILLIGRAM(S): at 06:46

## 2020-11-28 RX ADMIN — Medication 975 MILLIGRAM(S): at 14:44

## 2020-11-28 RX ADMIN — Medication 15 MILLIGRAM(S): at 18:13

## 2020-11-28 RX ADMIN — SODIUM CHLORIDE 3 MILLILITER(S): 9 INJECTION INTRAMUSCULAR; INTRAVENOUS; SUBCUTANEOUS at 06:18

## 2020-11-28 RX ADMIN — GABAPENTIN 100 MILLIGRAM(S): 400 CAPSULE ORAL at 14:44

## 2020-11-28 RX ADMIN — GABAPENTIN 100 MILLIGRAM(S): 400 CAPSULE ORAL at 06:18

## 2020-11-28 RX ADMIN — GABAPENTIN 100 MILLIGRAM(S): 400 CAPSULE ORAL at 23:19

## 2020-11-28 RX ADMIN — VALSARTAN 320 MILLIGRAM(S): 80 TABLET ORAL at 07:32

## 2020-11-28 RX ADMIN — ONDANSETRON 4 MILLIGRAM(S): 8 TABLET, FILM COATED ORAL at 08:37

## 2020-11-28 RX ADMIN — Medication 325 MILLIGRAM(S): at 18:13

## 2020-11-28 RX ADMIN — PANTOPRAZOLE SODIUM 40 MILLIGRAM(S): 20 TABLET, DELAYED RELEASE ORAL at 06:18

## 2020-11-28 RX ADMIN — Medication 50 MILLIGRAM(S): at 18:13

## 2020-11-28 RX ADMIN — Medication 15 MILLIGRAM(S): at 06:18

## 2020-11-28 RX ADMIN — AMLODIPINE BESYLATE 5 MILLIGRAM(S): 2.5 TABLET ORAL at 07:32

## 2020-11-28 RX ADMIN — TRAMADOL HYDROCHLORIDE 50 MILLIGRAM(S): 50 TABLET ORAL at 07:31

## 2020-11-28 RX ADMIN — Medication 400 MILLIGRAM(S): at 07:31

## 2020-11-28 RX ADMIN — SODIUM CHLORIDE 3 MILLILITER(S): 9 INJECTION INTRAMUSCULAR; INTRAVENOUS; SUBCUTANEOUS at 23:10

## 2020-11-28 NOTE — OCCUPATIONAL THERAPY INITIAL EVALUATION ADULT - GENERAL OBSERVATIONS, REHAB EVAL
Patient received semisupine in bed in NAD. Per LOUIE Jarrett, patient okay to participate in OT evaluation.

## 2020-11-28 NOTE — OCCUPATIONAL THERAPY INITIAL EVALUATION ADULT - ADDITIONAL COMMENTS
Patient reports her daughter-in-law assists as needed with ADLs (especially lower body dressing/bathing).

## 2020-11-28 NOTE — PHYSICAL THERAPY INITIAL EVALUATION ADULT - ADDITIONAL COMMENTS
Patient lived in a private home with family, requiring 3 steps to negotiate. Patient required a single axis cane for ambulation. Prior to admission, patient required assistance from family across all transfer and ADL skills.    Patient was left semi-supine in bed, with all lines/tubes intact. Call [place] within reach. LOUIE irvin.

## 2020-11-28 NOTE — OCCUPATIONAL THERAPY INITIAL EVALUATION ADULT - LIVES WITH, PROFILE
Patient lives in a private house with her son, daughter-in-law, and grandkids with 3 steps to enter. Once inside, patient remains on first level. Patient has a walk-in shower with grab bars as well as commode at home prior to admission.

## 2020-11-28 NOTE — PHYSICAL THERAPY INITIAL EVALUATION ADULT - RANGE OF MOTION EXAMINATION, REHAB EVAL
bilateral upper extremity ROM was WFL (within functional limits)/bilateral lower extremity ROM was WFL (within functional limits)/per functional mobility assessment bilateral upper extremity ROM was WFL (within functional limits)/Left knee flexion 0-90 degrees/bilateral lower extremity ROM was WFL (within functional limits)

## 2020-11-28 NOTE — PHYSICAL THERAPY INITIAL EVALUATION ADULT - SITTING BALANCE: DYNAMIC
I have reviewed the preoperative history and physical as well as lab findings. After discussion with the patient today, and physical examination, there have been no interval changes. We will proceed with operative intervention as detailed in my clinical notes.     normal balance

## 2020-11-28 NOTE — PHYSICAL THERAPY INITIAL EVALUATION ADULT - PERTINENT HX OF CURRENT PROBLEM, REHAB EVAL
Patient is a 69 yo female p/w postop for left total knee replacement on preop dx of unilateral primary osteoarthritis, left knee. h/o bilateral knee pain for 5 years.  pt reports 10/10 pain to the left knee with walking and stair climbing. PMH: HTN, pt s/p right replacement february 2020. Patient is a 71 yo female with diagnosis of unilateral primary osteoarthritis, left knee. Patient with history of bilateral knee pain for 5 years.  pt reports 10/10 pain to the left knee with walking and stair climbing. PMH: HTN, pt s/p right replacement february 2020. Pt now sp Left TKR.

## 2020-11-28 NOTE — OCCUPATIONAL THERAPY INITIAL EVALUATION ADULT - ANTICIPATED DISCHARGE DISPOSITION, OT EVAL
Anticipating home with no OT needs following discharge. Patient reports her daughter-in-law is able to assist as needed with ADLs as prior to surgery. Patient demonstrates understanding of basic ADL and functional transfer skills. Patient is functionally cleared for discharge from OT perspective. OT to continue to follow while inpatient.

## 2020-11-28 NOTE — CONSULT NOTE ADULT - SUBJECTIVE AND OBJECTIVE BOX
Patient is a 70y old  Female who presents with a chief complaint of left total knee arthroplasty (27 Nov 2020 17:06)      HPI:  71 y/o Daniel speaking female w/ Hx HTN, OA of B/L knee presents to [facility] preop for left total knee replacement on preop dx of unilateral primary osteoarthritis, left knee. h/o bilateral knee pain for 5 years. pt s/p right replacement february 2020. pt reports 10/10 pain to the left knee with walking and stair climbing. now for surgery. Was scheduled initially in September 2020 for surgery but got cancelled due to low Na levels. Followed up with PCP. Patient was on low salt diet, then  changed to regular diet. Recent Na levels 139.  (13 Nov 2020 09:33)      History limited due to: [ ] Dementia  [ ] Delirium  [ ] Condition    Pain Symptoms if applicable:             	                         none	   mild         moderate         severe  Pain:	                            0	    1-3	     4-6	         7-10  Location:	  Modifying factors:	  Associated symptoms:	    Function: [ ] Independent  [ ] Assistance  [ ] Total care  [ ] Non-ambulatory    Allergies    No Known Allergies    Intolerances        HOME MEDICATIONS: [ x] Reviewed  Daily Blanche oral tablet: 1 tab(s) orally once a day. last dose  11/13/2020  Vitamin D2 50,000 intl units (1.25 mg) oral capsule: 1 cap(s) orally once a week Saturdays  Metoprolol Tartrate 50 mg oral tablet: 1.5 tab(s) (75 mg) orally 2 times a day  valsartan 320 mg oral tablet: 1 tab(s) orally once a day  11 AM    MEDICATIONS  (STANDING):  acetaminophen   Tablet .. 975 milliGRAM(s) Oral every 8 hours  amLODIPine   Tablet 5 milliGRAM(s) Oral daily  aspirin enteric coated 325 milliGRAM(s) Oral two times a day  gabapentin 100 milliGRAM(s) Oral every 8 hours  ketorolac   Injectable 15 milliGRAM(s) IV Push every 6 hours  lactated ringers. 1000 milliLiter(s) (125 mL/Hr) IV Continuous <Continuous>  lactated ringers. 1000 milliLiter(s) (30 mL/Hr) IV Continuous <Continuous>  metoprolol tartrate 50 milliGRAM(s) Oral two times a day  pantoprazole    Tablet 40 milliGRAM(s) Oral before breakfast  sodium chloride 0.9% lock flush 3 milliLiter(s) IV Push every 8 hours  traMADol 50 milliGRAM(s) Oral every 8 hours  valsartan 320 milliGRAM(s) Oral daily    MEDICATIONS  (PRN):  aluminum hydroxide/magnesium hydroxide/simethicone Suspension 30 milliLiter(s) Oral four times a day PRN Indigestion  HYDROmorphone  Injectable 0.5 milliGRAM(s) IV Push once PRN Severe Pain (7 - 10)  magnesium hydroxide Suspension 30 milliLiter(s) Oral daily PRN Constipation  ondansetron Injectable 4 milliGRAM(s) IV Push every 6 hours PRN Nausea and/or Vomiting  oxyCODONE    IR 5 milliGRAM(s) Oral every 4 hours PRN Moderate Pain (4 - 6)  oxyCODONE    IR 10 milliGRAM(s) Oral every 4 hours PRN Severe Pain (7 - 10)  senna 2 Tablet(s) Oral at bedtime PRN Constipation      PAST MEDICAL & SURGICAL HISTORY:  Unilateral primary osteoarthritis, left knee    Hypertension    Chronic GERD    Osteoarthritis    S/P cataract extraction  bilateral    H/O total knee replacement, 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    [ x] Reviewed     SOCIAL HISTORY:  Residence: [ ] [facility]  [ ] [facility]  [x ] Community  [x ] Substance abuse: none  [ x] Tobacco: none  [x ] Alcohol use: none    FAMILY HISTORY:  No pertinent family history in first degree relatives        REVIEW OF SYSTEMS:    CONSTITUTIONAL: No fever, weight loss, or fatigue  EYES: No eye pain, visual disturbances, or discharge  ENMT:  No difficulty hearing, tinnitus, vertigo; No sinus or throat pain  NECK: No pain or stiffness  BREASTS: No pain, masses, or nipple discharge  RESPIRATORY: No cough, wheezing, chills or hemoptysis; No shortness of breath  CARDIOVASCULAR: No chest pain, palpitations, dizziness, or leg swelling  GASTROINTESTINAL: No abdominal or epigastric pain. No nausea, vomiting, or hematemesis; No diarrhea or constipation. No melena or hematochezia.  GENITOURINARY: No dysuria, frequency, hematuria, or incontinence  NEUROLOGICAL: No headaches, memory loss, loss of strength, numbness, or tremors  SKIN: No itching, burning, rashes, or lesions   LYMPH NODES: No enlarged glands  ENDOCRINE: No heat or cold intolerance; No hair loss  MUSCULOSKELETAL: No muscle or back pain  PSYCHIATRIC: No depression, anxiety, mood swings, or difficulty sleeping  HEME/LYMPH: No easy bruising, or bleeding gums  ALLERGY AND IMMUNOLOGIC: No hives or eczema    [  ] All other ROS negative  [  ] Unable to obtain due to poor mental status    Vital Signs Last 24 Hrs  T(C): 36.6 (28 Nov 2020 08:06), Max: 37 (27 Nov 2020 16:00)  T(F): 97.9 (28 Nov 2020 08:06), Max: 98.6 (27 Nov 2020 16:00)  HR: 79 (28 Nov 2020 08:06) (52 - 79)  BP: 128/67 (28 Nov 2020 08:06) (100/59 - 165/66)  BP(mean): 77 (27 Nov 2020 17:00) (62 - 96)  RR: 16 (28 Nov 2020 08:06) (12 - 17)  SpO2: 100% (28 Nov 2020 08:06) (98% - 100%)    PHYSICAL EXAM:    GENERAL: NAD, well-groomed, well-developed  HEAD:  Atraumatic, Normocephalic  EYES: EOMI, PERRLA, conjunctiva and sclera clear  ENMT: Moist mucous membranes  NECK: Supple, No JVD  RESPIRATORY: Clear to auscultation bilaterally; No rales, rhonchi, wheezing, or rubs  CARDIOVASCULAR: Regular rate and rhythm; No murmurs, rubs, or gallops  GASTROINTESTINAL: Soft, Nontender, Nondistended; Bowel sounds present  GENITOURINARY: Not examined  EXTREMITIES:  2+ Peripheral Pulses, No clubbing, cyanosis, or edema  NERVOUS SYSTEM:  Alert & Oriented X3; Moving all 4 extremities; No gross sensory deficits  HEME/LYMPH: No lymphadenopathy noted  SKIN: No rashes or lesions; Incisions C/D/I    LABS:                        9.2    13.01 )-----------( 147      ( 28 Nov 2020 05:20 )             29.0     11-28    133<L>  |  99  |  15  ----------------------------<  201<H>  4.5   |  20<L>  |  0.79    Ca    8.7      28 Nov 2020 05:20          CAPILLARY BLOOD GLUCOSE          RADIOLOGY & ADDITIONAL STUDIES:    EKG:   Personally Reviewed:  [ ] YES     Imaging: < from: Xray Knee 1 or 2 Views, Right (02.06.20 @ 16:08) >  IMPRESSION:  Unconstrained right total knee prosthesis implanted.    Narrow lucent gap at cement bone interface of anterior aspect of the femoral component on lateral view. Otherwise intact and aligned hardwareand no periprosthetic fractures.    Postoperative soft tissue changes.    Surgical skin staples overlie the anterior incision site.    Correlate with intraoperative findings.      < end of copied text >    Personally Reviewed:  [ x] YES               Consultant(s) notes reviewed:    Care Discussed with Consultant(s)/Other Providers: Ortho     Patient is a 70y old  Female who presents with a chief complaint of left total knee arthroplasty (27 Nov 2020 17:06)      HPI:  71 y/o Daniel speaking female w/ Hx HTN, OA of B/L knee s/p Right TKR 2/2020 , hyponatremia p/w worsening left knee pain with walking and stair climbing now s/p left total knee replacement on 11/27.         Pain Symptoms if applicable:             	                         none	   mild         moderate         severe  Pain:	                            0	    1-3	     4-6	         7-10  Location:	  Modifying factors:	  Associated symptoms:	    Function: [x ] Independent  [ ] Assistance  [ ] Total care  [ ] Non-ambulatory    Allergies    No Known Allergies    Intolerances        HOME MEDICATIONS: [ x] Reviewed  Daily Blanche oral tablet: 1 tab(s) orally once a day. last dose  11/13/2020  Vitamin D2 50,000 intl units (1.25 mg) oral capsule: 1 cap(s) orally once a week Saturdays  Metoprolol Tartrate 50 mg oral tablet: 1.5 tab(s) (75 mg) orally 2 times a day  valsartan 320 mg oral tablet: 1 tab(s) orally once a day  11 AM    MEDICATIONS  (STANDING):  acetaminophen   Tablet .. 975 milliGRAM(s) Oral every 8 hours  amLODIPine   Tablet 5 milliGRAM(s) Oral daily  aspirin enteric coated 325 milliGRAM(s) Oral two times a day  gabapentin 100 milliGRAM(s) Oral every 8 hours  ketorolac   Injectable 15 milliGRAM(s) IV Push every 6 hours  lactated ringers. 1000 milliLiter(s) (125 mL/Hr) IV Continuous <Continuous>  lactated ringers. 1000 milliLiter(s) (30 mL/Hr) IV Continuous <Continuous>  metoprolol tartrate 50 milliGRAM(s) Oral two times a day  pantoprazole    Tablet 40 milliGRAM(s) Oral before breakfast  sodium chloride 0.9% lock flush 3 milliLiter(s) IV Push every 8 hours  traMADol 50 milliGRAM(s) Oral every 8 hours  valsartan 320 milliGRAM(s) Oral daily    MEDICATIONS  (PRN):  aluminum hydroxide/magnesium hydroxide/simethicone Suspension 30 milliLiter(s) Oral four times a day PRN Indigestion  HYDROmorphone  Injectable 0.5 milliGRAM(s) IV Push once PRN Severe Pain (7 - 10)  magnesium hydroxide Suspension 30 milliLiter(s) Oral daily PRN Constipation  ondansetron Injectable 4 milliGRAM(s) IV Push every 6 hours PRN Nausea and/or Vomiting  oxyCODONE    IR 5 milliGRAM(s) Oral every 4 hours PRN Moderate Pain (4 - 6)  oxyCODONE    IR 10 milliGRAM(s) Oral every 4 hours PRN Severe Pain (7 - 10)  senna 2 Tablet(s) Oral at bedtime PRN Constipation      PAST MEDICAL & SURGICAL HISTORY:  Unilateral primary osteoarthritis, left knee    Hypertension    Chronic GERD    Osteoarthritis    S/P cataract extraction  bilateral    H/O total knee replacement, 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    [ x] Reviewed     SOCIAL HISTORY:  Residence: [ ] [facility]  [ ] [facility]  [x ] Community  [x ] Substance abuse: none  [ x] Tobacco: none  [x ] Alcohol use: none    FAMILY HISTORY:  No pertinent family history in first degree relatives        REVIEW OF SYSTEMS:    CONSTITUTIONAL: No fever, weight loss, or fatigue  EYES: No eye pain, visual disturbances, or discharge  ENMT:  No difficulty hearing, tinnitus, vertigo; No sinus or throat pain  NECK: No pain or stiffness  BREASTS: No pain, masses, or nipple discharge  RESPIRATORY: No cough, wheezing, chills or hemoptysis; No shortness of breath  CARDIOVASCULAR: No chest pain, palpitations, dizziness, or leg swelling  GASTROINTESTINAL: No abdominal or epigastric pain. No nausea, vomiting, or hematemesis; No diarrhea or constipation. No melena or hematochezia.  GENITOURINARY: No dysuria, frequency, hematuria, or incontinence  NEUROLOGICAL: No headaches, memory loss, loss of strength, numbness, or tremors  SKIN: No itching, burning, rashes, or lesions   LYMPH NODES: No enlarged glands  ENDOCRINE: No heat or cold intolerance; No hair loss  MUSCULOSKELETAL: left knee pain  PSYCHIATRIC: No depression, anxiety, mood swings, or difficulty sleeping  HEME/LYMPH: No easy bruising, or bleeding gums  ALLERGY AND IMMUNOLOGIC: No hives or eczema    [x  ] All other ROS negative  [  ] Unable to obtain due to poor mental status    Vital Signs Last 24 Hrs  T(C): 36.6 (28 Nov 2020 08:06), Max: 37 (27 Nov 2020 16:00)  T(F): 97.9 (28 Nov 2020 08:06), Max: 98.6 (27 Nov 2020 16:00)  HR: 79 (28 Nov 2020 08:06) (52 - 79)  BP: 128/67 (28 Nov 2020 08:06) (100/59 - 165/66)  BP(mean): 77 (27 Nov 2020 17:00) (62 - 96)  RR: 16 (28 Nov 2020 08:06) (12 - 17)  SpO2: 100% (28 Nov 2020 08:06) (98% - 100%)    PHYSICAL EXAM:    GENERAL: NAD, well-groomed, well-developed  HEAD:  Atraumatic, Normocephalic  EYES: EOMI, PERRLA, conjunctiva and sclera clear  ENMT: Moist mucous membranes  NECK: Supple, No JVD  RESPIRATORY: Clear to auscultation bilaterally; No rales, rhonchi, wheezing, or rubs  CARDIOVASCULAR: Regular rate and rhythm; No murmurs, rubs, or gallops  GASTROINTESTINAL: Soft, Nontender, Nondistended; Bowel sounds present  GENITOURINARY: Not examined  EXTREMITIES:  2+ Peripheral Pulses, No clubbing, cyanosis, or edema  NERVOUS SYSTEM:  Alert & Oriented X3; Moving all 4 extremities; No gross sensory deficits  HEME/LYMPH: No lymphadenopathy noted  SKIN: No rashes or lesions; Incisions C/D/I    LABS:                        9.2    13.01 )-----------( 147      ( 28 Nov 2020 05:20 )             29.0     11-28    133<L>  |  99  |  15  ----------------------------<  201<H>  4.5   |  20<L>  |  0.79    Ca    8.7      28 Nov 2020 05:20          CAPILLARY BLOOD GLUCOSE          RADIOLOGY & ADDITIONAL STUDIES:    EKG:   Personally Reviewed:  [ ] YES     Imaging: < from: Xray Knee 1 or 2 Views, Right (02.06.20 @ 16:08) >  IMPRESSION:  Unconstrained right total knee prosthesis implanted.    Narrow lucent gap at cement bone interface of anterior aspect of the femoral component on lateral view. Otherwise intact and aligned hardwareand no periprosthetic fractures.    Postoperative soft tissue changes.    Surgical skin staples overlie the anterior incision site.    Correlate with intraoperative findings.      < end of copied text >    Personally Reviewed:  [ x] YES               Consultant(s) notes reviewed:    Care Discussed with Consultant(s)/Other Providers: Ortho     Patient is a 70y old  Female who presents with a chief complaint of left total knee arthroplasty (27 Nov 2020 17:06)      HPI:  71 y/o Daniel speaking female w/ Hx HTN, OA of B/L knee s/p Right TKR 2/2020 , hyponatremia p/w worsening left knee pain with walking and stair climbing now s/p left total knee replacement on 11/27.  Patient says she has minimal pain in her left knee and walked with physical therapy. She had 1 episode of nausea/vomiting in a.m. which is now resolved. Denies cp, SOB, abdominal pain, N/V/D/dizziness.  [facility]  # [id number] provided interpretation.         Pain Symptoms if applicable:             	                         none	   mild         moderate         severe  Pain: 3	                            0	    1-3	     4-6	         7-10  Location: left knee	  Modifying factors: movement	  Associated symptoms:	    Function: [x ] Independent  [ ] Assistance  [ ] Total care  [ ] Non-ambulatory    Allergies    No Known Allergies    Intolerances        HOME MEDICATIONS: [ x] Reviewed  Daily Blanche oral tablet: 1 tab(s) orally once a day. last dose  11/13/2020  Vitamin D2 50,000 intl units (1.25 mg) oral capsule: 1 cap(s) orally once a week Saturdays  Metoprolol Tartrate 50 mg oral tablet: 1.5 tab(s) (75 mg) orally 2 times a day  valsartan 320 mg oral tablet: 1 tab(s) orally once a day  11 AM    MEDICATIONS  (STANDING):  acetaminophen   Tablet .. 975 milliGRAM(s) Oral every 8 hours  amLODIPine   Tablet 5 milliGRAM(s) Oral daily  aspirin enteric coated 325 milliGRAM(s) Oral two times a day  gabapentin 100 milliGRAM(s) Oral every 8 hours  ketorolac   Injectable 15 milliGRAM(s) IV Push every 6 hours  lactated ringers. 1000 milliLiter(s) (125 mL/Hr) IV Continuous <Continuous>  lactated ringers. 1000 milliLiter(s) (30 mL/Hr) IV Continuous <Continuous>  metoprolol tartrate 50 milliGRAM(s) Oral two times a day  pantoprazole    Tablet 40 milliGRAM(s) Oral before breakfast  sodium chloride 0.9% lock flush 3 milliLiter(s) IV Push every 8 hours  traMADol 50 milliGRAM(s) Oral every 8 hours  valsartan 320 milliGRAM(s) Oral daily    MEDICATIONS  (PRN):  aluminum hydroxide/magnesium hydroxide/simethicone Suspension 30 milliLiter(s) Oral four times a day PRN Indigestion  HYDROmorphone  Injectable 0.5 milliGRAM(s) IV Push once PRN Severe Pain (7 - 10)  magnesium hydroxide Suspension 30 milliLiter(s) Oral daily PRN Constipation  ondansetron Injectable 4 milliGRAM(s) IV Push every 6 hours PRN Nausea and/or Vomiting  oxyCODONE    IR 5 milliGRAM(s) Oral every 4 hours PRN Moderate Pain (4 - 6)  oxyCODONE    IR 10 milliGRAM(s) Oral every 4 hours PRN Severe Pain (7 - 10)  senna 2 Tablet(s) Oral at bedtime PRN Constipation      PAST MEDICAL & SURGICAL HISTORY:  Unilateral primary osteoarthritis, left knee    Hypertension    Chronic GERD    Osteoarthritis    S/P cataract extraction  bilateral    H/O total knee replacement, 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    [ x] Reviewed     SOCIAL HISTORY:  Residence: [ ] [facility]  [ ] [facility]  [x ] Community  [x ] Substance abuse: none  [ x] Tobacco: none  [x ] Alcohol use: none    FAMILY HISTORY:  No pertinent family history in first degree relatives        REVIEW OF SYSTEMS:    CONSTITUTIONAL: No fever, weight loss, or fatigue  EYES: No eye pain, visual disturbances, or discharge  ENMT:  No difficulty hearing, tinnitus, vertigo; No sinus or throat pain  NECK: No pain or stiffness  BREASTS: No pain, masses, or nipple discharge  RESPIRATORY: No cough, wheezing, chills or hemoptysis; No shortness of breath  CARDIOVASCULAR: No chest pain, palpitations, dizziness, or leg swelling  GASTROINTESTINAL: 1 episode of nausea, vomiting. No abdominal or epigastric pain. No hematemesis; No diarrhea or constipation. No melena or hematochezia.  GENITOURINARY: No dysuria, frequency, hematuria, or incontinence  NEUROLOGICAL: No headaches, memory loss, loss of strength, numbness, or tremors  SKIN: No itching, burning, rashes, or lesions   LYMPH NODES: No enlarged glands  ENDOCRINE: No heat or cold intolerance; No hair loss  MUSCULOSKELETAL: left knee pain  PSYCHIATRIC: No depression, anxiety, mood swings, or difficulty sleeping  HEME/LYMPH: No easy bruising, or bleeding gums  ALLERGY AND IMMUNOLOGIC: No hives or eczema    [x  ] All other ROS negative  [  ] Unable to obtain due to poor mental status    Vital Signs Last 24 Hrs  T(C): 36.6 (28 Nov 2020 08:06), Max: 37 (27 Nov 2020 16:00)  T(F): 97.9 (28 Nov 2020 08:06), Max: 98.6 (27 Nov 2020 16:00)  HR: 79 (28 Nov 2020 08:06) (52 - 79)  BP: 128/67 (28 Nov 2020 08:06) (100/59 - 165/66)  BP(mean): 77 (27 Nov 2020 17:00) (62 - 96)  RR: 16 (28 Nov 2020 08:06) (12 - 17)  SpO2: 100% (28 Nov 2020 08:06) (98% - 100%)    PHYSICAL EXAM:    GENERAL: NAD, well-groomed, well-developed  HEAD:  Atraumatic, Normocephalic  EYES: EOMI, PERRLA, conjunctiva and sclera clear  ENMT: Moist mucous membranes  NECK: Supple, No JVD  RESPIRATORY: Clear to auscultation bilaterally; No rales, rhonchi, wheezing, or rubs  CARDIOVASCULAR: Regular rate and rhythm; No murmurs, rubs, or gallops  GASTROINTESTINAL: Soft, Nontender, Nondistended; Bowel sounds present  GENITOURINARY: Not examined  EXTREMITIES:  Left knee bandage. 2+ Peripheral Pulses, No clubbing, cyanosis, or edema  NERVOUS SYSTEM:  Alert & Oriented X3; Moving all 4 extremities; No gross sensory deficits  HEME/LYMPH: No lymphadenopathy noted  SKIN: No rashes or lesions; Incisions C/D/I    LABS:                        9.2    13.01 )-----------( 147      ( 28 Nov 2020 05:20 )             29.0     11-28    133<L>  |  99  |  15  ----------------------------<  201<H>  4.5   |  20<L>  |  0.79    Ca    8.7      28 Nov 2020 05:20          CAPILLARY BLOOD GLUCOSE          RADIOLOGY & ADDITIONAL STUDIES:    EKG:   Personally Reviewed:  [ ] YES     Imaging: < from: Xray Knee 1 or 2 Views, Right (02.06.20 @ 16:08) >  IMPRESSION:  Unconstrained right total knee prosthesis implanted.    Narrow lucent gap at cement bone interface of anterior aspect of the femoral component on lateral view. Otherwise intact and aligned hardware and no periprosthetic fractures.    Postoperative soft tissue changes.    Surgical skin staples overlie the anterior incision site.    Correlate with intraoperative findings.      < end of copied text >    Personally Reviewed:  [ x] YES               Consultant(s) notes reviewed:    Care Discussed with Consultant(s)/Other Providers: Ortho

## 2020-11-28 NOTE — DISCHARGE NOTE NURSING/CASE MANAGEMENT/SOCIAL WORK - NSDCPNINST_GEN_ALL_CORE
Schedule follow-up appointment(s) as instructed. Notify provider for signs/symptoms of infection, including chills/fever; redness, swelling, and/or drainage at incision site; and/or pain not relieved by pain medication. Notify provider if you experience nausea/vomiting or you are unable to tolerate food. If you experience calf tenderness, swelling, and/or warmth or if you have chest pain, call 911 or go to your nearest emergency room. No heavy lifting or straining. No not sit for more than 30 minutes three times a day or 45 minutes twice a day. Elevate your leg three times a day (morning, afternoon, and evening) for two hours each time; place pillows under ankle/calf; DO NOT place pillows behind knee. Drink plenty of water and take over-the-counter stool softeners to prevent constipation, which can be a side-effect of some pain medications. Schedule follow-up appointment(s) as instructed. Notify provider for signs/symptoms of infection, including chills/fever; redness, swelling, and/or drainage at incision site; and/or pain not relieved by pain medication. Notify provider if you experience nausea/vomiting or you are unable to tolerate food. If you experience calf tenderness, swelling, and/or warmth or if you have chest pain, call 911 or go to your nearest emergency room. No heavy lifting or straining. Do not sit for more than 30 minutes three times a day or 45 minutes twice a day. Elevate your leg three times a day (morning, afternoon, and evening) for two hours each time; place pillows under ankle/calf; DO NOT place pillows behind knee. Drink plenty of water and take over-the-counter stool softeners to prevent constipation, which can be a side-effect of some pain medications.

## 2020-11-28 NOTE — PHYSICAL THERAPY INITIAL EVALUATION ADULT - PATIENT PROFILE REVIEW, REHAB EVAL
PT orders received: Out of bed to chair. Consult with RN Luiza ROSE, pt may participate in PT evaluation./yes

## 2020-11-28 NOTE — DISCHARGE NOTE NURSING/CASE MANAGEMENT/SOCIAL WORK - PATIENT PORTAL LINK FT
You can access the FollowMyHealth Patient Portal offered by [facility] by registering at the following website: [email or web address]. By joining [employer]’s FollowMyHealth portal, you will also be able to view your health information using other applications (apps) compatible with our system.

## 2020-11-28 NOTE — OCCUPATIONAL THERAPY INITIAL EVALUATION ADULT - PHYSICAL ASSIST/NONPHYSICAL ASSIST: SIT/SUPINE, REHAB EVAL
[facility]    Cardiology Consultation    [address] [place] Location: [facility] 2W/    1929 MRN [id number]   Consulting Date 2018 [facility] [id number]   Consulting Physician Dawson Akers MD Attending Physician Anatoly Patton MD Chroni 1 person assist/verbal cues/nonverbal cues (demo/gestures)

## 2020-11-28 NOTE — OCCUPATIONAL THERAPY INITIAL EVALUATION ADULT - PERTINENT HX OF CURRENT PROBLEM, REHAB EVAL
70 year old female with history of HTN, OA of B/L knee s/p Right TKR 2/2020, hyponatremia presenting s/p left total knee replacement on 11/27/2020.

## 2020-11-28 NOTE — PROGRESS NOTE ADULT - SUBJECTIVE AND OBJECTIVE BOX
Ortho Progress Note    S: Patient seen and examined. No acute events overnight. Pain well controlled with current regimen. Denies lightheadedness/dizziness, CP/SOB. Tolerating diet.       O:  Physical Exam:  Gen: Laying in bed, NAD, alert and oriented.   Resp: Unlabored breathing  Ext: Dressing c/d/i  EHL/FHL/TA/Sol intact          + SILT DP/SP/ONTIVEROS/Sa          +DP, extremity WWP    Vital Signs Last 24 Hrs  T(C): 36.4 (28 Nov 2020 01:45), Max: 37 (27 Nov 2020 16:00)  T(F): 97.6 (28 Nov 2020 01:45), Max: 98.6 (27 Nov 2020 16:00)  HR: 60 (28 Nov 2020 01:45) (52 - 78)  BP: 125/56 (28 Nov 2020 01:45) (100/59 - 165/66)  BP(mean): 77 (27 Nov 2020 17:00) (62 - 96)  RR: 16 (28 Nov 2020 01:45) (12 - 17)  SpO2: 98% (28 Nov 2020 01:45) (98% - 100%)                          10.1   8.48  )-----------( 150      ( 27 Nov 2020 15:26 )             32.5       11-27    134<L>  |  103  |  14  ----------------------------<  117<H>  4.9   |  22  |  0.97        A/P: 70F s/p L TKA    Neuro: Pain control  Resp: IS  GI: Regular diet, bowel reg  MSK: WBAT, PT/OT  Heme: DVT PPX: A325 BID  Dispo: pending

## 2020-11-28 NOTE — CONSULT NOTE ADULT - ASSESSMENT
71 y/o Daniel speaking female w/ Hx HTN, OA of B/L knee s/p Right TKR 2/2020 , hyponatremia p/w worsening left knee pain with walking and stair climbing now s/p left total knee replacement on 11/27.

## 2020-11-28 NOTE — CONSULT NOTE ADULT - PROBLEM SELECTOR RECOMMENDATION 9
s/p Left TKR  management as per ortho  pain controlled on Tylenol/Oxy IR/ tramadol/ Toradol prn  encouraged incentive spirometry and PT

## 2020-11-29 DIAGNOSIS — E87.1 HYPO-OSMOLALITY AND HYPONATREMIA: ICD-10-CM

## 2020-11-29 PROCEDURE — 99233 SBSQ HOSP IP/OBS HIGH 50: CPT

## 2020-11-29 RX ADMIN — GABAPENTIN 100 MILLIGRAM(S): 400 CAPSULE ORAL at 22:09

## 2020-11-29 RX ADMIN — TRAMADOL HYDROCHLORIDE 50 MILLIGRAM(S): 50 TABLET ORAL at 15:42

## 2020-11-29 RX ADMIN — Medication 975 MILLIGRAM(S): at 14:55

## 2020-11-29 RX ADMIN — Medication 975 MILLIGRAM(S): at 22:00

## 2020-11-29 RX ADMIN — Medication 50 MILLIGRAM(S): at 17:11

## 2020-11-29 RX ADMIN — Medication 975 MILLIGRAM(S): at 06:28

## 2020-11-29 RX ADMIN — TRAMADOL HYDROCHLORIDE 50 MILLIGRAM(S): 50 TABLET ORAL at 22:09

## 2020-11-29 RX ADMIN — SODIUM CHLORIDE 3 MILLILITER(S): 9 INJECTION INTRAMUSCULAR; INTRAVENOUS; SUBCUTANEOUS at 06:27

## 2020-11-29 RX ADMIN — VALSARTAN 320 MILLIGRAM(S): 80 TABLET ORAL at 07:38

## 2020-11-29 RX ADMIN — Medication 15 MILLIGRAM(S): at 06:28

## 2020-11-29 RX ADMIN — Medication 975 MILLIGRAM(S): at 22:09

## 2020-11-29 RX ADMIN — ONDANSETRON 4 MILLIGRAM(S): 8 TABLET, FILM COATED ORAL at 11:26

## 2020-11-29 RX ADMIN — TRAMADOL HYDROCHLORIDE 50 MILLIGRAM(S): 50 TABLET ORAL at 14:55

## 2020-11-29 RX ADMIN — Medication 975 MILLIGRAM(S): at 15:42

## 2020-11-29 RX ADMIN — OXYCODONE HYDROCHLORIDE 5 MILLIGRAM(S): 5 TABLET ORAL at 09:56

## 2020-11-29 RX ADMIN — PANTOPRAZOLE SODIUM 40 MILLIGRAM(S): 20 TABLET, DELAYED RELEASE ORAL at 06:28

## 2020-11-29 RX ADMIN — OXYCODONE HYDROCHLORIDE 5 MILLIGRAM(S): 5 TABLET ORAL at 09:04

## 2020-11-29 RX ADMIN — Medication 325 MILLIGRAM(S): at 06:28

## 2020-11-29 RX ADMIN — ONDANSETRON 4 MILLIGRAM(S): 8 TABLET, FILM COATED ORAL at 17:16

## 2020-11-29 RX ADMIN — Medication 30 MILLILITER(S): at 14:54

## 2020-11-29 RX ADMIN — Medication 325 MILLIGRAM(S): at 17:11

## 2020-11-29 RX ADMIN — Medication 50 MILLIGRAM(S): at 06:28

## 2020-11-29 RX ADMIN — GABAPENTIN 100 MILLIGRAM(S): 400 CAPSULE ORAL at 06:29

## 2020-11-29 RX ADMIN — SODIUM CHLORIDE 125 MILLILITER(S): 9 INJECTION, SOLUTION INTRAVENOUS at 17:12

## 2020-11-29 RX ADMIN — SODIUM CHLORIDE 125 MILLILITER(S): 9 INJECTION, SOLUTION INTRAVENOUS at 22:10

## 2020-11-29 RX ADMIN — SODIUM CHLORIDE 3 MILLILITER(S): 9 INJECTION INTRAMUSCULAR; INTRAVENOUS; SUBCUTANEOUS at 14:48

## 2020-11-29 RX ADMIN — GABAPENTIN 100 MILLIGRAM(S): 400 CAPSULE ORAL at 14:55

## 2020-11-29 NOTE — PROVIDER CONTACT NOTE (OTHER) - ASSESSMENT
Pt sitting in bed, with son at bedside. Son and pt agree that it is unsafe for patient to go home because of her stomach.

## 2020-11-29 NOTE — PROGRESS NOTE ADULT - PROBLEM SELECTOR PLAN 1
s/p Left TKR  management as per ortho  pain controlled on Tylenol/Oxy IR/ tramadol/ Toradol prn  encouraged incentive spirometry and PT. s/p Left TKR  management as per ortho  pain controlled on Tylenol/Oxy IR/ tramadol/ Toradol prn  encouraged incentive spirometry and PT.  d/c planning as per primary team

## 2020-11-29 NOTE — PROGRESS NOTE ADULT - SUBJECTIVE AND OBJECTIVE BOX
Ortho Progress Note    S: Patient seen and examined. No acute events overnight. Pain well controlled with current regimen. Denies lightheadedness/dizziness, CP/SOB. Tolerating diet.       O:  Physical Exam:  Gen: Laying in bed, NAD, alert and oriented.   Resp: Unlabored breathing  Ext: Dressing c/d/i  EHL/FHL/TA/Sol intact          + SILT DP/SP/ONTIVEROS/Sa          +DP, extremity WWP    ICU Vital Signs Last 24 Hrs  T(C): 36.8 (28 Nov 2020 21:20), Max: 36.8 (28 Nov 2020 21:20)  T(F): 98.2 (28 Nov 2020 21:20), Max: 98.2 (28 Nov 2020 21:20)  HR: 65 (28 Nov 2020 21:20) (64 - 79)  BP: 116/56 (28 Nov 2020 21:20) (116/56 - 140/66)  BP(mean): --  ABP: --  ABP(mean): --  RR: 17 (28 Nov 2020 21:20) (16 - 17)  SpO2: 100% (28 Nov 2020 21:20) (100% - 100%)        A/P: 70F s/p L TKA, progressing well    Neuro: Pain control  Resp: IS  GI: Regular diet, bowel reg  MSK: WBAT, PT/OT  Heme: DVT PPX: A325 BID  Dispo: shakila 11/29

## 2020-11-29 NOTE — PROGRESS NOTE ADULT - SUBJECTIVE AND OBJECTIVE BOX
Patient is a 70y old  Female who presents with a chief complaint of left TKR (28 Nov 2020 09:21)      SUBJECTIVE / OVERNIGHT EVENTS:    MEDICATIONS  (STANDING):  acetaminophen   Tablet .. 975 milliGRAM(s) Oral every 8 hours  amLODIPine   Tablet 5 milliGRAM(s) Oral daily  aspirin enteric coated 325 milliGRAM(s) Oral two times a day  gabapentin 100 milliGRAM(s) Oral every 8 hours  lactated ringers. 1000 milliLiter(s) (125 mL/Hr) IV Continuous <Continuous>  lactated ringers. 1000 milliLiter(s) (30 mL/Hr) IV Continuous <Continuous>  metoprolol tartrate 50 milliGRAM(s) Oral two times a day  pantoprazole    Tablet 40 milliGRAM(s) Oral before breakfast  sodium chloride 0.9% lock flush 3 milliLiter(s) IV Push every 8 hours  traMADol 50 milliGRAM(s) Oral every 8 hours  valsartan 320 milliGRAM(s) Oral daily    MEDICATIONS  (PRN):  aluminum hydroxide/magnesium hydroxide/simethicone Suspension 30 milliLiter(s) Oral four times a day PRN Indigestion  HYDROmorphone  Injectable 0.5 milliGRAM(s) IV Push once PRN Severe Pain (7 - 10)  magnesium hydroxide Suspension 30 milliLiter(s) Oral daily PRN Constipation  ondansetron Injectable 4 milliGRAM(s) IV Push every 6 hours PRN Nausea and/or Vomiting  oxyCODONE    IR 5 milliGRAM(s) Oral every 4 hours PRN Moderate Pain (4 - 6)  oxyCODONE    IR 10 milliGRAM(s) Oral every 4 hours PRN Severe Pain (7 - 10)  senna 2 Tablet(s) Oral at bedtime PRN Constipation      Vital Signs Last 24 Hrs  T(C): 36.6 (29 Nov 2020 09:03), Max: 36.8 (28 Nov 2020 21:20)  T(F): 97.9 (29 Nov 2020 09:03), Max: 98.2 (28 Nov 2020 21:20)  HR: 57 (29 Nov 2020 09:03) (57 - 66)  BP: 140/64 (29 Nov 2020 09:03) (116/54 - 140/66)  BP(mean): --  RR: 17 (29 Nov 2020 09:03) (17 - 18)  SpO2: 100% (29 Nov 2020 09:03) (99% - 100%)  CAPILLARY BLOOD GLUCOSE        I&O's Summary    28 Nov 2020 07:01  -  29 Nov 2020 07:00  --------------------------------------------------------  IN: 0 mL / OUT: 350 mL / NET: -350 mL        PHYSICAL EXAM:  GENERAL: NAD, well-developed  HEAD:  Atraumatic, Normocephalic  EYES: EOMI, PERRLA, conjunctiva and sclera clear  NECK: Supple, No JVD  CHEST/LUNG: Clear to auscultation bilaterally; No wheeze  HEART: Regular rate and rhythm; No murmurs, rubs, or gallops  ABDOMEN: Soft, Nontender, Nondistended; Bowel sounds present  EXTREMITIES:  2+ Peripheral Pulses, No clubbing, cyanosis, or edema  PSYCH: AAOx3  NEUROLOGY: non-focal  SKIN: No rashes or lesions    LABS:                        9.2    13.01 )-----------( 147      ( 28 Nov 2020 05:20 )             29.0     11-28    133<L>  |  99  |  15  ----------------------------<  201<H>  4.5   |  20<L>  |  0.79    Ca    8.7      28 Nov 2020 05:20                RADIOLOGY & ADDITIONAL TESTS:    Imaging Personally Reviewed:    Consultant(s) Notes Reviewed:      Care Discussed with Consultants/Other Providers:   Patient is a 70y old  Female who presents with a chief complaint of left TKR (28 Nov 2020 09:21)      SUBJECTIVE / OVERNIGHT EVENTS:  Patient has no new complaints. Denies cp, SOB, abdominal pain, N/V/D     MEDICATIONS  (STANDING):  acetaminophen   Tablet .. 975 milliGRAM(s) Oral every 8 hours  amLODIPine   Tablet 5 milliGRAM(s) Oral daily  aspirin enteric coated 325 milliGRAM(s) Oral two times a day  gabapentin 100 milliGRAM(s) Oral every 8 hours  lactated ringers. 1000 milliLiter(s) (125 mL/Hr) IV Continuous <Continuous>  lactated ringers. 1000 milliLiter(s) (30 mL/Hr) IV Continuous <Continuous>  metoprolol tartrate 50 milliGRAM(s) Oral two times a day  pantoprazole    Tablet 40 milliGRAM(s) Oral before breakfast  sodium chloride 0.9% lock flush 3 milliLiter(s) IV Push every 8 hours  traMADol 50 milliGRAM(s) Oral every 8 hours  valsartan 320 milliGRAM(s) Oral daily    MEDICATIONS  (PRN):  aluminum hydroxide/magnesium hydroxide/simethicone Suspension 30 milliLiter(s) Oral four times a day PRN Indigestion  HYDROmorphone  Injectable 0.5 milliGRAM(s) IV Push once PRN Severe Pain (7 - 10)  magnesium hydroxide Suspension 30 milliLiter(s) Oral daily PRN Constipation  ondansetron Injectable 4 milliGRAM(s) IV Push every 6 hours PRN Nausea and/or Vomiting  oxyCODONE    IR 5 milliGRAM(s) Oral every 4 hours PRN Moderate Pain (4 - 6)  oxyCODONE    IR 10 milliGRAM(s) Oral every 4 hours PRN Severe Pain (7 - 10)  senna 2 Tablet(s) Oral at bedtime PRN Constipation      Vital Signs Last 24 Hrs  T(C): 36.6 (29 Nov 2020 09:03), Max: 36.8 (28 Nov 2020 21:20)  T(F): 97.9 (29 Nov 2020 09:03), Max: 98.2 (28 Nov 2020 21:20)  HR: 57 (29 Nov 2020 09:03) (57 - 66)  BP: 140/64 (29 Nov 2020 09:03) (116/54 - 140/66)  BP(mean): --  RR: 17 (29 Nov 2020 09:03) (17 - 18)  SpO2: 100% (29 Nov 2020 09:03) (99% - 100%)  CAPILLARY BLOOD GLUCOSE        I&O's Summary    28 Nov 2020 07:01  -  29 Nov 2020 07:00  --------------------------------------------------------  IN: 0 mL / OUT: 350 mL / NET: -350 mL        PHYSICAL EXAM:  GENERAL: NAD, well-developed  HEAD:  Atraumatic, Normocephalic  EYES: EOMI, PERRLA, conjunctiva and sclera clear  NECK: Supple, No JVD  CHEST/LUNG: Clear to auscultation bilaterally; No wheeze  HEART: Regular rate and rhythm; No murmurs, rubs, or gallops  ABDOMEN: Soft, Nontender, Nondistended; Bowel sounds present  EXTREMITIES:  Left knee bandage. 2+ Peripheral Pulses, No clubbing, cyanosis, or edema  PSYCH: AAOx3  NEUROLOGY: non-focal  SKIN: No rashes or lesions    LABS:                        9.2    13.01 )-----------( 147      ( 28 Nov 2020 05:20 )             29.0     11-28    133<L>  |  99  |  15  ----------------------------<  201<H>  4.5   |  20<L>  |  0.79    Ca    8.7      28 Nov 2020 05:20                RADIOLOGY & ADDITIONAL TESTS:    Imaging Personally Reviewed:    Consultant(s) Notes Reviewed:      Care Discussed with Consultants/Other Providers:

## 2020-11-29 NOTE — PROGRESS NOTE ADULT - ASSESSMENT
69 y/o Daniel speaking female w/ Hx HTN, OA of B/L knee s/p Right TKR 2/2020 , hyponatremia p/w worsening left knee pain with walking and stair climbing now s/p left total knee replacement on 11/27.

## 2020-11-29 NOTE — PROVIDER CONTACT NOTE (OTHER) - SITUATION
Patient refusing discharge home, reports that they feel weak. Pt vomited 2xs today, given zofran and reported relief but is concerned to go home since they threw up 2xs.

## 2020-11-30 VITALS
HEART RATE: 52 BPM | TEMPERATURE: 98 F | DIASTOLIC BLOOD PRESSURE: 58 MMHG | RESPIRATION RATE: 16 BRPM | SYSTOLIC BLOOD PRESSURE: 125 MMHG | OXYGEN SATURATION: 99 %

## 2020-11-30 PROCEDURE — 99232 SBSQ HOSP IP/OBS MODERATE 35: CPT

## 2020-11-30 RX ADMIN — Medication 975 MILLIGRAM(S): at 06:06

## 2020-11-30 RX ADMIN — GABAPENTIN 100 MILLIGRAM(S): 400 CAPSULE ORAL at 13:20

## 2020-11-30 RX ADMIN — GABAPENTIN 100 MILLIGRAM(S): 400 CAPSULE ORAL at 06:05

## 2020-11-30 RX ADMIN — SODIUM CHLORIDE 3 MILLILITER(S): 9 INJECTION INTRAMUSCULAR; INTRAVENOUS; SUBCUTANEOUS at 13:13

## 2020-11-30 RX ADMIN — Medication 325 MILLIGRAM(S): at 06:05

## 2020-11-30 RX ADMIN — TRAMADOL HYDROCHLORIDE 50 MILLIGRAM(S): 50 TABLET ORAL at 06:17

## 2020-11-30 RX ADMIN — PANTOPRAZOLE SODIUM 40 MILLIGRAM(S): 20 TABLET, DELAYED RELEASE ORAL at 06:06

## 2020-11-30 RX ADMIN — VALSARTAN 320 MILLIGRAM(S): 80 TABLET ORAL at 06:05

## 2020-11-30 RX ADMIN — Medication 975 MILLIGRAM(S): at 13:20

## 2020-11-30 RX ADMIN — Medication 975 MILLIGRAM(S): at 06:14

## 2020-11-30 RX ADMIN — SODIUM CHLORIDE 3 MILLILITER(S): 9 INJECTION INTRAMUSCULAR; INTRAVENOUS; SUBCUTANEOUS at 03:47

## 2020-11-30 NOTE — PROGRESS NOTE ADULT - PROBLEM SELECTOR PLAN 1
s/p Left TKR  management as per ortho  pain controlled on Tylenol/Oxy IR/ tramadol/ Toradol prn  encouraged incentive spirometry and PT.  d/c planning as per primary team

## 2020-11-30 NOTE — PROGRESS NOTE ADULT - SUBJECTIVE AND OBJECTIVE BOX
[facility] Division of Hospital Medicine  Devyn Her MD  Pager (M-F, 8A-5P): [phone]  Other Times:  [phone]    Patient is a 70y old  Female who presents with a chief complaint of elective Left total knee arthroplasty 11/27/2020 (30 Nov 2020 07:01)    SUBJECTIVE / OVERNIGHT EVENTS:  Patient offers no new complaints.  No F/C, N/V, CP, SOB, Cough, lightheadedness, dizziness, abdominal pain, diarrhea, dysuria.    MEDICATIONS  (STANDING):  acetaminophen   Tablet .. 975 milliGRAM(s) Oral every 8 hours  amLODIPine   Tablet 5 milliGRAM(s) Oral daily  aspirin enteric coated 325 milliGRAM(s) Oral two times a day  gabapentin 100 milliGRAM(s) Oral every 8 hours  lactated ringers. 1000 milliLiter(s) (125 mL/Hr) IV Continuous <Continuous>  lactated ringers. 1000 milliLiter(s) (30 mL/Hr) IV Continuous <Continuous>  metoprolol tartrate 50 milliGRAM(s) Oral two times a day  pantoprazole    Tablet 40 milliGRAM(s) Oral before breakfast  sodium chloride 0.9% lock flush 3 milliLiter(s) IV Push every 8 hours  traMADol 50 milliGRAM(s) Oral every 8 hours  valsartan 320 milliGRAM(s) Oral daily    MEDICATIONS  (PRN):  aluminum hydroxide/magnesium hydroxide/simethicone Suspension 30 milliLiter(s) Oral four times a day PRN Indigestion  HYDROmorphone  Injectable 0.5 milliGRAM(s) IV Push once PRN Severe Pain (7 - 10)  magnesium hydroxide Suspension 30 milliLiter(s) Oral daily PRN Constipation  ondansetron Injectable 4 milliGRAM(s) IV Push every 6 hours PRN Nausea and/or Vomiting  oxyCODONE    IR 5 milliGRAM(s) Oral every 4 hours PRN Moderate Pain (4 - 6)  oxyCODONE    IR 10 milliGRAM(s) Oral every 4 hours PRN Severe Pain (7 - 10)  senna 2 Tablet(s) Oral at bedtime PRN Constipation      Vital Signs Last 24 Hrs  T(C): 36.3 (30 Nov 2020 08:42), Max: 36.9 (29 Nov 2020 14:47)  T(F): 97.4 (30 Nov 2020 08:42), Max: 98.4 (29 Nov 2020 14:47)  HR: 50 (30 Nov 2020 08:42) (50 - 60)  BP: 145/59 (30 Nov 2020 08:42) (119/80 - 148/59)  BP(mean): --  RR: 15 (30 Nov 2020 08:42) (15 - 17)  SpO2: 100% (30 Nov 2020 08:42) (100% - 100%)  CAPILLARY BLOOD GLUCOSE        I&O's Summary    30 Nov 2020 07:01  -  30 Nov 2020 12:48  --------------------------------------------------------  IN: 0 mL / OUT: 0 mL / NET: 0 mL        PHYSICAL EXAM:  GENERAL: NAD, well-developed  HEAD:  Atraumatic, Normocephalic  EYES: EOMI, PERRLA, conjunctiva and sclera clear  NECK: Supple, No JVD  CHEST/LUNG: Clear to auscultation bilaterally; No wheeze  HEART: Regular rate and rhythm; No murmurs, rubs, or gallops  ABDOMEN: Soft, Nontender, Nondistended; Bowel sounds present  EXTREMITIES:  Left knee bandage. 2+ Peripheral Pulses, No clubbing, cyanosis, or edema  PSYCH: AAOx3  NEUROLOGY: non-focal  SKIN: No rashes or lesions    LABS:                    RADIOLOGY & ADDITIONAL TESTS:    Imaging Personally Reviewed:    Care Discussed with Consultants/Other Providers:    Care Discussed with Orthopedic PA about:

## 2020-11-30 NOTE — PROGRESS NOTE ADULT - ATTENDING COMMENTS
Patient seen and agree with above plan.
Patient seen and agree with above plan.
Patient medically optimized for discharge.  Discharge planning 40 minutes - discussed with patient and consultants.

## 2020-11-30 NOTE — PROGRESS NOTE ADULT - SUBJECTIVE AND OBJECTIVE BOX
Ortho Progress Note  JUANY GUZMAN   MRN-[id number]    70yFemale is s/p elective Left total knee arthroplasty POD#3 w/out any c/o.  Resting comfortably, NAD, ANO x 3    Vital Signs Last 24 Hrs  T(C): 36.9 (30 Nov 2020 05:58), Max: 36.9 (29 Nov 2020 14:47)  T(F): 98.4 (30 Nov 2020 05:58), Max: 98.4 (29 Nov 2020 14:47)  HR: 51 (30 Nov 2020 05:58) (51 - 60)  BP: 148/59 (30 Nov 2020 05:58) (116/54 - 148/59)  BP(mean): --  RR: 16 (30 Nov 2020 05:58) (16 - 17)  SpO2: 100% (30 Nov 2020 05:58) (100% - 100%)  No Known Allergies      S/P L TKA   L knee wound dressing is C/D/I  motor BLE grossly intact  sensation BLE intact to light touch                A/P Ortho Stable s/p elective Left total knee arthroplasty POD#3  continue Aspirin for anticoagulation   continue Physical Therapy BID: WBAT, out of bed for gait training  discharge planning today when pt is cleared by physical therapy for safe home discharge

## 2020-12-02 LAB — SURGICAL PATHOLOGY STUDY: SIGNIFICANT CHANGE UP

## 2020-12-11 ENCOUNTER — APPOINTMENT (OUTPATIENT)
Dept: ORTHOPEDIC SURGERY | Facility: CLINIC | Age: 70
End: 2020-12-11
Payer: MEDICAID

## 2020-12-11 VITALS — TEMPERATURE: 97.2 F

## 2020-12-11 PROCEDURE — 99024 POSTOP FOLLOW-UP VISIT: CPT

## 2020-12-21 RX ORDER — TRAMADOL HYDROCHLORIDE 50 MG/1
50 TABLET, COATED ORAL
Qty: 20 | Refills: 0 | Status: ACTIVE | COMMUNITY
Start: 2020-12-21 | End: 1900-01-01

## 2020-12-21 RX ORDER — SENNOSIDES 8.6 MG TABLETS 8.6 MG/1
8.6 TABLET ORAL
Qty: 30 | Refills: 0 | Status: ACTIVE | COMMUNITY
Start: 2020-12-21 | End: 1900-01-01

## 2021-01-14 ENCOUNTER — APPOINTMENT (OUTPATIENT)
Dept: ORTHOPEDIC SURGERY | Facility: CLINIC | Age: 71
End: 2021-01-14
Payer: MEDICAID

## 2021-01-14 VITALS — TEMPERATURE: 96.7 F

## 2021-01-14 PROCEDURE — 99024 POSTOP FOLLOW-UP VISIT: CPT

## 2021-02-25 ENCOUNTER — APPOINTMENT (OUTPATIENT)
Dept: ORTHOPEDIC SURGERY | Facility: CLINIC | Age: 71
End: 2021-02-25
Payer: MEDICAID

## 2021-02-25 PROCEDURE — 99024 POSTOP FOLLOW-UP VISIT: CPT

## 2021-05-28 NOTE — ASU PATIENT PROFILE, ADULT - BLOOD TRANSFUSION, PREVIOUS, PROFILE
Patient: Natalia Mejia [[id number]]     Procedure: SERVICE TO GASTROENTEROLOGY Status: Needs Scheduling   Requested appt date:  Authorizing: Parth Phillips MD in [facility] INTERNAL MEDICINE   Referral: [id number]   (Pending Review)       Priority: Routine               Diagnosis: Polyp of colon, unspecified part of colon, unspecified type [K63.5]          no

## 2021-08-06 ENCOUNTER — APPOINTMENT (OUTPATIENT)
Dept: ORTHOPEDIC SURGERY | Facility: CLINIC | Age: 71
End: 2021-08-06

## 2021-09-09 ENCOUNTER — APPOINTMENT (OUTPATIENT)
Dept: ORTHOPEDIC SURGERY | Facility: CLINIC | Age: 71
End: 2021-09-09
Payer: MEDICAID

## 2021-09-09 VITALS
BODY MASS INDEX: 30 KG/M2 | WEIGHT: 163 LBS | HEART RATE: 56 BPM | DIASTOLIC BLOOD PRESSURE: 82 MMHG | SYSTOLIC BLOOD PRESSURE: 182 MMHG | HEIGHT: 62 IN

## 2021-09-09 DIAGNOSIS — Z96.651 PRESENCE OF RIGHT ARTIFICIAL KNEE JOINT: ICD-10-CM

## 2021-09-09 DIAGNOSIS — Z96.652 PRESENCE OF LEFT ARTIFICIAL KNEE JOINT: ICD-10-CM

## 2021-09-09 PROCEDURE — 99213 OFFICE O/P EST LOW 20 MIN: CPT

## 2021-09-09 PROCEDURE — 73562 X-RAY EXAM OF KNEE 3: CPT | Mod: RT

## 2021-11-10 NOTE — ASU PREOP CHECKLIST - ADVANCE DIRECTIVE ADDRESSED/READDRESSED
done/offered and declined The patient has been re-examined and I agree with the above assessment or I updated with my findings.

## 2022-01-10 NOTE — ASU PATIENT PROFILE, ADULT - AS SC BRADEN SENSORY
(4) no impairment Assistance OOB with selected safe patient handling equipment/Assistance with ambulation/Communicate Fall Risk and Risk Factors to all staff, patient, and family/Discuss with provider need for PT consult/Monitor gait and stability/Provide patient with walking aids - walker, cane, crutches/Reinforce activity limits and safety measures with patient and family/Visual Cue: Yellow wristband/Bed in lowest position, wheels locked, appropriate side rails in place/Call bell, personal items and telephone in reach/Instruct patient to call for assistance before getting out of bed or chair/Non-slip footwear when patient is out of bed/[place] to call system/Physically safe environment - no spills, clutter or unnecessary equipment/Purposeful Proactive Rounding/Room/bathroom lighting operational, light cord in reach

## 2022-01-27 NOTE — ASU PATIENT PROFILE, ADULT - ARRIVAL TIME
RD/tear warning symptoms reviewed. F&F brochure given. Call immediately if increase in floaters, flashes, veil, blur. 09:15

## 2023-03-09 NOTE — H&P PST ADULT - AS BP NONINV METHOD
----- Message from PHAN Hemphill sent at 3/8/2023  5:09 PM CST -----  Please notify patient his blood work and urinalysis within normal range.   auscultated w/ stethoscope

## 2023-04-14 NOTE — CONSULT NOTE ADULT - CONSULT REQUESTED DATE/TIME
07-Feb-2020 14:24 Hide Second Medication?: No Detail Level: None Bill J-Code: yes Dose Administered (Numbers Only): 0 Total Volume Injected In Cc (Will Not Affected Billing): 1 Post-Care Instructions: I reviewed with the patient in detail post-care instructions. Patient understands to keep the injection sites clean and call the clinic if there is any redness, swelling or pain. Administered By (Optional): TIM Medication (1) And Associated J-Code Units: Betamethasone Acetate, 3mg and Betamethasone Sodium Phosphate, 3mg Procedure Information: Please note that the numeric value listed in the Medication (1) and associated J-code units and Medication (2) and associated J-code units variables are j-code amounts and do not represent either the concentration or the total amount of the medications injected.  I strongly recommend selecting no to the Render J-code information in note question. This will allow your note to be more clear. If you are billing j-codes with your injection codes you need to document the total amount of the medication injected. This amount should match the j-code units. For example, if you are injecting Triamcinolone 40mg as an intramuscular injection you would select 40 for the dose field.. This would allow you to document  with 4 units (40mg = 10mg x 4). The total volume is not used to calculate j-codes only the amount of the medication administered. Consent: The risks of the medication were reviewed with the patient. Route: IM Dose Administered (Numbers Only): 6

## 2023-12-05 NOTE — PATIENT PROFILE ADULT - NSPROGENBLOODRESTRICT_GEN_A_NUR
How Severe Is Your Skin Cancer?: moderate Is This A New Presentation, Or A Follow-Up?: Follow Up Squamous Cell Carcinoma When Was Squamous Cell Carcinoma Biopsied? (Optional): 10/25/23 Accession # (Optional): [id number] none